# Patient Record
Sex: MALE | Race: WHITE | Employment: FULL TIME | URBAN - METROPOLITAN AREA
[De-identification: names, ages, dates, MRNs, and addresses within clinical notes are randomized per-mention and may not be internally consistent; named-entity substitution may affect disease eponyms.]

---

## 2017-01-09 ENCOUNTER — OFFICE VISIT (OUTPATIENT)
Dept: FAMILY MEDICINE CLINIC | Facility: CLINIC | Age: 52
End: 2017-01-09

## 2017-01-09 VITALS
DIASTOLIC BLOOD PRESSURE: 80 MMHG | SYSTOLIC BLOOD PRESSURE: 124 MMHG | TEMPERATURE: 98 F | HEART RATE: 70 BPM | RESPIRATION RATE: 20 BRPM | OXYGEN SATURATION: 98 % | WEIGHT: 231 LBS | BODY MASS INDEX: 33 KG/M2

## 2017-01-09 DIAGNOSIS — E78.00 HYPERCHOLESTEROLEMIA: ICD-10-CM

## 2017-01-09 DIAGNOSIS — E11.9 CONTROLLED TYPE 2 DIABETES MELLITUS WITHOUT COMPLICATION, WITHOUT LONG-TERM CURRENT USE OF INSULIN (HCC): Primary | ICD-10-CM

## 2017-01-09 DIAGNOSIS — G89.29 CHRONIC LEFT SHOULDER PAIN: ICD-10-CM

## 2017-01-09 DIAGNOSIS — M25.512 CHRONIC LEFT SHOULDER PAIN: ICD-10-CM

## 2017-01-09 PROCEDURE — 99214 OFFICE O/P EST MOD 30 MIN: CPT | Performed by: FAMILY MEDICINE

## 2017-01-09 RX ORDER — METFORMIN HYDROCHLORIDE 500 MG/1
500 TABLET, EXTENDED RELEASE ORAL 2 TIMES DAILY WITH MEALS
Qty: 180 TABLET | Refills: 1 | Status: SHIPPED | OUTPATIENT
Start: 2017-01-09 | End: 2017-07-05

## 2017-01-09 RX ORDER — ATORVASTATIN CALCIUM 20 MG/1
20 TABLET, FILM COATED ORAL NIGHTLY
Qty: 90 TABLET | Refills: 1 | Status: SHIPPED | OUTPATIENT
Start: 2017-01-09 | End: 2017-07-05

## 2017-01-09 NOTE — PATIENT INSTRUCTIONS
Continue metformin as you've been taking, once daily minimum, up to twice daily. Goal fasting is , goal after meals 140-180. Labs soon (this week).      Start atorvastatin (cholesterol lowering medicine), call if body aches or muscle aches in legs (th

## 2017-01-09 NOTE — PROGRESS NOTES
Tiffany Ashraf IS A 54year old male HERE FOR Patient presents with:  Diabetes       History of present illness:     Checking home sugars, over holidays higher. (average before holidays was fasting 140s to 150s, some 180s-190s holidays or late dinners). Take 1 tablet by mouth daily. Disp:  Rfl:    [DISCONTINUED] MetFORMIN HCl  MG Oral Tablet 24 Hr 1 to 2 pills daily as directed.  (Patient taking differently: Take 500 mg by mouth daily with breakfast.  ) Disp: 60 tablet Rfl: 3       Allergy:      No K rhythm  Abdomen soft nontender  Extremities Bilateral barefoot skin diabetic exam is normal, visualized feet and the appearance is normal.  Bilateral monofilament/sensation of both feet is normal.  Pulsation pedal pulse exam of both lower legs/feet is norm

## 2017-01-10 PROBLEM — E11.9 CONTROLLED TYPE 2 DIABETES MELLITUS WITHOUT COMPLICATION, WITHOUT LONG-TERM CURRENT USE OF INSULIN (HCC): Status: ACTIVE | Noted: 2017-01-10

## 2017-01-13 ENCOUNTER — LAB ENCOUNTER (OUTPATIENT)
Dept: LAB | Age: 52
End: 2017-01-13
Attending: FAMILY MEDICINE
Payer: COMMERCIAL

## 2017-01-13 DIAGNOSIS — E11.9 CONTROLLED TYPE 2 DIABETES MELLITUS WITHOUT COMPLICATION, WITHOUT LONG-TERM CURRENT USE OF INSULIN (HCC): ICD-10-CM

## 2017-01-13 DIAGNOSIS — M19.012 ARTHRITIS OF LEFT SHOULDER REGION: ICD-10-CM

## 2017-01-13 DIAGNOSIS — E78.00 HYPERCHOLESTEROLEMIA: ICD-10-CM

## 2017-01-13 LAB
ALBUMIN SERPL-MCNC: 3.7 G/DL (ref 3.5–4.8)
ALP LIVER SERPL-CCNC: 85 U/L (ref 45–117)
ALT SERPL-CCNC: 26 U/L (ref 17–63)
AST SERPL-CCNC: 19 U/L (ref 15–41)
BASOPHILS # BLD AUTO: 0.02 X10(3) UL (ref 0–0.1)
BASOPHILS NFR BLD AUTO: 0.3 %
BILIRUB SERPL-MCNC: 0.6 MG/DL (ref 0.1–2)
BUN BLD-MCNC: 13 MG/DL (ref 8–20)
CALCIUM BLD-MCNC: 8.5 MG/DL (ref 8.3–10.3)
CHLORIDE: 104 MMOL/L (ref 101–111)
CHOLEST SMN-MCNC: 158 MG/DL (ref ?–200)
CO2: 28 MMOL/L (ref 22–32)
CREAT BLD-MCNC: 1.1 MG/DL (ref 0.7–1.3)
CREAT UR-SCNC: 189 MG/DL
EOSINOPHIL # BLD AUTO: 0.17 X10(3) UL (ref 0–0.3)
EOSINOPHIL NFR BLD AUTO: 2.4 %
ERYTHROCYTE [DISTWIDTH] IN BLOOD BY AUTOMATED COUNT: 12.2 % (ref 11.5–16)
EST. AVERAGE GLUCOSE BLD GHB EST-MCNC: 143 MG/DL (ref 68–126)
GLUCOSE BLD-MCNC: 141 MG/DL (ref 70–99)
HBA1C MFR BLD HPLC: 6.6 % (ref ?–5.7)
HCT VFR BLD AUTO: 47.5 % (ref 37–53)
HDLC SERPL-MCNC: 45 MG/DL (ref 45–?)
HDLC SERPL: 3.51 {RATIO} (ref ?–4.97)
HGB BLD-MCNC: 16 G/DL (ref 13–17)
IMMATURE GRANULOCYTE COUNT: 0.04 X10(3) UL (ref 0–1)
IMMATURE GRANULOCYTE RATIO %: 0.6 %
LDLC SERPL CALC-MCNC: 93 MG/DL (ref ?–130)
LYMPHOCYTES # BLD AUTO: 2.71 X10(3) UL (ref 0.9–4)
LYMPHOCYTES NFR BLD AUTO: 38.1 %
M PROTEIN MFR SERPL ELPH: 7.1 G/DL (ref 6.1–8.3)
MCH RBC QN AUTO: 30.9 PG (ref 27–33.2)
MCHC RBC AUTO-ENTMCNC: 33.7 G/DL (ref 31–37)
MCV RBC AUTO: 91.7 FL (ref 80–99)
MICROALBUMIN UR-MCNC: 0.54 MG/DL
MICROALBUMIN/CREAT 24H UR-RTO: 2.9 UG/MG (ref ?–30)
MONOCYTES # BLD AUTO: 0.68 X10(3) UL (ref 0.1–0.6)
MONOCYTES NFR BLD AUTO: 9.6 %
NEUTROPHIL ABS PRELIM: 3.5 X10 (3) UL (ref 1.3–6.7)
NEUTROPHILS # BLD AUTO: 3.5 X10(3) UL (ref 1.3–6.7)
NEUTROPHILS NFR BLD AUTO: 49 %
NONHDLC SERPL-MCNC: 113 MG/DL (ref ?–130)
PLATELET # BLD AUTO: 253 10(3)UL (ref 150–450)
POTASSIUM SERPL-SCNC: 4.2 MMOL/L (ref 3.6–5.1)
RBC # BLD AUTO: 5.18 X10(6)UL (ref 4.3–5.7)
RED CELL DISTRIBUTION WIDTH-SD: 40.4 FL (ref 35.1–46.3)
SODIUM SERPL-SCNC: 141 MMOL/L (ref 136–144)
TRIGLYCERIDES: 101 MG/DL (ref ?–150)
VLDL: 20 MG/DL (ref 5–40)
WBC # BLD AUTO: 7.1 X10(3) UL (ref 4–13)

## 2017-01-13 PROCEDURE — 82570 ASSAY OF URINE CREATININE: CPT | Performed by: FAMILY MEDICINE

## 2017-01-13 PROCEDURE — 83036 HEMOGLOBIN GLYCOSYLATED A1C: CPT | Performed by: FAMILY MEDICINE

## 2017-01-13 PROCEDURE — 80053 COMPREHEN METABOLIC PANEL: CPT | Performed by: FAMILY MEDICINE

## 2017-01-13 PROCEDURE — 82043 UR ALBUMIN QUANTITATIVE: CPT | Performed by: FAMILY MEDICINE

## 2017-01-13 PROCEDURE — 80061 LIPID PANEL: CPT | Performed by: FAMILY MEDICINE

## 2017-01-13 PROCEDURE — 85025 COMPLETE CBC W/AUTO DIFF WBC: CPT | Performed by: FAMILY MEDICINE

## 2017-01-13 PROCEDURE — 36415 COLL VENOUS BLD VENIPUNCTURE: CPT | Performed by: FAMILY MEDICINE

## 2017-01-24 NOTE — TELEPHONE ENCOUNTER
Future Appointments    LOV 1/17    LAST LAB 1/17     LAST RX  ONETOUCH ULTRA BLUE In Vitro Strip 100 strip 0 11/1/2016         PROTOCOL  Diabetic Supplies Protocol Passed1    Refilled x 6 months.

## 2017-01-25 ENCOUNTER — TELEPHONE (OUTPATIENT)
Dept: FAMILY MEDICINE CLINIC | Facility: CLINIC | Age: 52
End: 2017-01-25

## 2017-01-25 NOTE — TELEPHONE ENCOUNTER
Future Appointments  Date Time Provider Florinda Jones   2/6/2017 9:00 AM Rashad Langford MD [facility]   2/17/2017 8:00 AM Rashad Langford MD [facility]     Probably should see us before shoulder surgery for medical clearance.  Can get pre-op l

## 2017-01-25 NOTE — TELEPHONE ENCOUNTER
Called and scheduled pre-surgical clearance appt. Date and time approved by [facility]     Future Appointments  Date Time Provider Florinda Jones   2/6/2017 7:00 AM [facility]   2/6/2017 7:30 AM [facility]   2/6/201

## 2017-02-06 ENCOUNTER — APPOINTMENT (OUTPATIENT)
Dept: LAB | Age: 52
End: 2017-02-06
Payer: COMMERCIAL

## 2017-02-06 ENCOUNTER — LAB ENCOUNTER (OUTPATIENT)
Dept: LAB | Age: 52
End: 2017-02-06
Payer: COMMERCIAL

## 2017-02-06 DIAGNOSIS — M19.012 ARTHRITIS OF LEFT SHOULDER REGION: ICD-10-CM

## 2017-02-06 LAB
ANTIBODY SCREEN: NEGATIVE
RH BLOOD TYPE: NEGATIVE

## 2017-02-06 PROCEDURE — 93005 ELECTROCARDIOGRAM TRACING: CPT | Performed by: INTERNAL MEDICINE

## 2017-02-06 PROCEDURE — 87147 CULTURE TYPE IMMUNOLOGIC: CPT

## 2017-02-06 PROCEDURE — 86850 RBC ANTIBODY SCREEN: CPT

## 2017-02-06 PROCEDURE — 36415 COLL VENOUS BLD VENIPUNCTURE: CPT

## 2017-02-06 PROCEDURE — 93010 ELECTROCARDIOGRAM REPORT: CPT | Performed by: INTERNAL MEDICINE

## 2017-02-06 PROCEDURE — 87081 CULTURE SCREEN ONLY: CPT

## 2017-02-06 PROCEDURE — 86900 BLOOD TYPING SEROLOGIC ABO: CPT

## 2017-02-06 PROCEDURE — 86901 BLOOD TYPING SEROLOGIC RH(D): CPT

## 2017-02-06 NOTE — H&P
[address]    PATIENT'S NAME: Tye Carmen   ATTENDING PHYSICIAN: Marcus Harrison M.D.    PATIENT ACCOUNT#:   [de-identified]    LOCATION:  [facility]  MEDICAL RECORD #:   [id number]       YOB: 1965  ADMISSION DATE:       02/17/2017    HIST Lipitor, metformin, glyburide, multivitamin. ALLERGIES:  None. SOCIAL HISTORY:  The patient lives in [address] [place] [address],[address]. He works for [employer]. He does not smoke. He does drink alcohol, 0 to 1 standard drink per week. He denies illicit drug use.     FAMILY HI

## 2017-02-09 LAB
P AXIS: 48 DEGREES
P-R INTERVAL: 150 MS
Q-T INTERVAL: 390 MS
QRS DURATION: 70 MS
QTC CALCULATION (BEZET): 430 MS
R AXIS: -4 DEGREES
T AXIS: 1 DEGREES
VENTRICULAR RATE: 73 BPM

## 2017-02-10 ENCOUNTER — ANESTHESIA EVENT (OUTPATIENT)
Dept: SURGERY | Facility: HOSPITAL | Age: 52
DRG: 483 | End: 2017-02-10
Payer: COMMERCIAL

## 2017-02-10 ENCOUNTER — OFFICE VISIT (OUTPATIENT)
Dept: FAMILY MEDICINE CLINIC | Facility: CLINIC | Age: 52
End: 2017-02-10

## 2017-02-10 VITALS
DIASTOLIC BLOOD PRESSURE: 80 MMHG | SYSTOLIC BLOOD PRESSURE: 118 MMHG | OXYGEN SATURATION: 98 % | BODY MASS INDEX: 33 KG/M2 | WEIGHT: 233 LBS | HEART RATE: 77 BPM

## 2017-02-10 DIAGNOSIS — Z01.818 PRE-OPERATIVE EXAM: Primary | ICD-10-CM

## 2017-02-10 DIAGNOSIS — E11.9 CONTROLLED TYPE 2 DIABETES MELLITUS WITHOUT COMPLICATION, WITHOUT LONG-TERM CURRENT USE OF INSULIN (HCC): ICD-10-CM

## 2017-02-10 DIAGNOSIS — M19.012 OSTEOARTHRITIS OF LEFT SHOULDER, UNSPECIFIED OSTEOARTHRITIS TYPE: ICD-10-CM

## 2017-02-10 DIAGNOSIS — E78.00 HYPERCHOLESTEROLEMIA: ICD-10-CM

## 2017-02-10 PROCEDURE — 99243 OFF/OP CNSLTJ NEW/EST LOW 30: CPT | Performed by: FAMILY MEDICINE

## 2017-02-10 NOTE — PATIENT INSTRUCTIONS
Don't take anti-inflammatories or herbal supplements in the next week. Take your metformin the night before surgery, atorvastatin also the day before surgery, no medications on day of surgery before operation.

## 2017-02-10 NOTE — H&P
Emily Sun is as 54year old male.  Patient presents with:  Schedule Surgery: on 2/17 for left shoulder replacement       Surgeon: Amaris Kruse  Procedure: Left shoulder replacement  Location: [facility]  Date: 2/17/17    We were requested by the surgeon a Grandmother    • Diabetes Maternal Grandmother    • Cancer Maternal Grandfather      Lung   • Cancer Paternal Grandmother      Brain       Social history:         Social History   Marital Status:   Spouse Name: N/A    Years of Education: N/A  Number change in appetite, heartburn, diarrhea, constipation. : No pain, frequency, urgency or incontinence with urination. Male: No testicular or scrotal swelling or tenderness.  No nocturia or difficulty with urine stream.  Rheumatologic: limited abduction l 02/06/2017     [facility] Lab Encounter on 02/06/2017  MSSA/MRSA Culture Date: 02/06/2017    Value: Light Staphylococcus aureus*   ABO BLOOD TYPE Value: O  Date: 02/06/2017   [facility] BLOOD TYPE Value: Negative  Date: 02/06/2017   Antibody Screen Value: Negative  Date: 33.7(g/dL)  Date: 01/13/2017   RDW Value: 12.2(%)  Date: 01/13/2017   RDW-SD Value: 40.4(fL)  Date: 01/13/2017   Neutrophil Absolute Prel* Value: 3.50(x10 (3) uL)  Date: 01/13/2017   Neutrophil Absolute Value: 3.50(x10(3) uL)  Date: 01/13/2017   Lymphocyte

## 2017-02-11 PROBLEM — M19.012 OSTEOARTHRITIS OF LEFT SHOULDER: Status: ACTIVE | Noted: 2017-02-11

## 2017-02-17 ENCOUNTER — ANESTHESIA (OUTPATIENT)
Dept: SURGERY | Facility: HOSPITAL | Age: 52
DRG: 483 | End: 2017-02-17
Payer: COMMERCIAL

## 2017-02-17 ENCOUNTER — HOSPITAL ENCOUNTER (INPATIENT)
Facility: HOSPITAL | Age: 52
LOS: 1 days | Discharge: HOME HEALTH | DRG: 483 | End: 2017-02-18
Attending: ORTHOPAEDIC SURGERY | Admitting: ORTHOPAEDIC SURGERY
Payer: COMMERCIAL

## 2017-02-17 ENCOUNTER — APPOINTMENT (OUTPATIENT)
Dept: GENERAL RADIOLOGY | Facility: HOSPITAL | Age: 52
DRG: 483 | End: 2017-02-17
Attending: ORTHOPAEDIC SURGERY
Payer: COMMERCIAL

## 2017-02-17 ENCOUNTER — SURGERY (OUTPATIENT)
Age: 52
End: 2017-02-17

## 2017-02-17 DIAGNOSIS — M19.012 ARTHRITIS OF LEFT SHOULDER REGION: Primary | ICD-10-CM

## 2017-02-17 LAB
CREAT BLD-MCNC: 1.04 MG/DL (ref 0.7–1.3)
GLUCOSE BLD-MCNC: 112 MG/DL (ref 65–99)
GLUCOSE BLD-MCNC: 133 MG/DL (ref 65–99)
GLUCOSE BLD-MCNC: 150 MG/DL (ref 65–99)
GLUCOSE BLD-MCNC: 226 MG/DL (ref 65–99)

## 2017-02-17 PROCEDURE — 73020 X-RAY EXAM OF SHOULDER: CPT

## 2017-02-17 PROCEDURE — 99252 IP/OBS CONSLTJ NEW/EST SF 35: CPT | Performed by: HOSPITALIST

## 2017-02-17 PROCEDURE — 0RRK0JZ REPLACEMENT OF LEFT SHOULDER JOINT WITH SYNTHETIC SUBSTITUTE, OPEN APPROACH: ICD-10-PCS | Performed by: ORTHOPAEDIC SURGERY

## 2017-02-17 DEVICE — IMPLANTABLE DEVICE: Type: IMPLANTABLE DEVICE | Site: SHOULDER | Status: FUNCTIONAL

## 2017-02-17 DEVICE — IMPLANTABLE DEVICE
Type: IMPLANTABLE DEVICE | Site: SHOULDER | Status: FUNCTIONAL
Brand: BIGLIANI/FLATOW® TRABECULAR METAL™

## 2017-02-17 RX ORDER — DEXTROSE MONOHYDRATE 25 G/50ML
50 INJECTION, SOLUTION INTRAVENOUS
Status: DISCONTINUED | OUTPATIENT
Start: 2017-02-17 | End: 2017-02-17 | Stop reason: HOSPADM

## 2017-02-17 RX ORDER — METFORMIN HYDROCHLORIDE 500 MG/1
500 TABLET, EXTENDED RELEASE ORAL 2 TIMES DAILY WITH MEALS
Status: DISCONTINUED | OUTPATIENT
Start: 2017-02-17 | End: 2017-02-17

## 2017-02-17 RX ORDER — DEXTROSE MONOHYDRATE 25 G/50ML
50 INJECTION, SOLUTION INTRAVENOUS
Status: DISCONTINUED | OUTPATIENT
Start: 2017-02-17 | End: 2017-02-18

## 2017-02-17 RX ORDER — HYDROMORPHONE HYDROCHLORIDE 1 MG/ML
0.2 INJECTION, SOLUTION INTRAMUSCULAR; INTRAVENOUS; SUBCUTANEOUS EVERY 2 HOUR PRN
Status: DISCONTINUED | OUTPATIENT
Start: 2017-02-17 | End: 2017-02-18

## 2017-02-17 RX ORDER — SODIUM CHLORIDE 9 MG/ML
INJECTION, SOLUTION INTRAVENOUS CONTINUOUS
Status: DISCONTINUED | OUTPATIENT
Start: 2017-02-17 | End: 2017-02-18

## 2017-02-17 RX ORDER — ATORVASTATIN CALCIUM 20 MG/1
20 TABLET, FILM COATED ORAL NIGHTLY
Status: DISCONTINUED | OUTPATIENT
Start: 2017-02-17 | End: 2017-02-18

## 2017-02-17 RX ORDER — NALOXONE HYDROCHLORIDE 0.4 MG/ML
80 INJECTION, SOLUTION INTRAMUSCULAR; INTRAVENOUS; SUBCUTANEOUS AS NEEDED
Status: DISCONTINUED | OUTPATIENT
Start: 2017-02-17 | End: 2017-02-17 | Stop reason: HOSPADM

## 2017-02-17 RX ORDER — ONDANSETRON 2 MG/ML
4 INJECTION INTRAMUSCULAR; INTRAVENOUS EVERY 4 HOURS PRN
Status: DISCONTINUED | OUTPATIENT
Start: 2017-02-17 | End: 2017-02-18

## 2017-02-17 RX ORDER — OXYCODONE HYDROCHLORIDE 10 MG/1
10 TABLET ORAL EVERY 4 HOURS PRN
Status: DISCONTINUED | OUTPATIENT
Start: 2017-02-17 | End: 2017-02-18

## 2017-02-17 RX ORDER — KETOROLAC TROMETHAMINE 30 MG/ML
30 INJECTION, SOLUTION INTRAMUSCULAR; INTRAVENOUS EVERY 6 HOURS
Status: DISCONTINUED | OUTPATIENT
Start: 2017-02-17 | End: 2017-02-18

## 2017-02-17 RX ORDER — HYDROMORPHONE HYDROCHLORIDE 1 MG/ML
0.4 INJECTION, SOLUTION INTRAMUSCULAR; INTRAVENOUS; SUBCUTANEOUS EVERY 5 MIN PRN
Status: DISCONTINUED | OUTPATIENT
Start: 2017-02-17 | End: 2017-02-17 | Stop reason: HOSPADM

## 2017-02-17 RX ORDER — DEXTROSE MONOHYDRATE 50 MG/ML
INJECTION, SOLUTION INTRAVENOUS CONTINUOUS
Status: DISCONTINUED | OUTPATIENT
Start: 2017-02-17 | End: 2017-02-17

## 2017-02-17 RX ORDER — OXYCODONE HCL 10 MG/1
10 TABLET, FILM COATED, EXTENDED RELEASE ORAL
Status: DISCONTINUED | OUTPATIENT
Start: 2017-02-17 | End: 2017-02-18

## 2017-02-17 RX ORDER — DOCUSATE SODIUM 100 MG/1
100 CAPSULE, LIQUID FILLED ORAL 2 TIMES DAILY
Status: DISCONTINUED | OUTPATIENT
Start: 2017-02-17 | End: 2017-02-18

## 2017-02-17 RX ORDER — OXYCODONE HYDROCHLORIDE 10 MG/1
20 TABLET ORAL EVERY 4 HOURS PRN
Status: DISCONTINUED | OUTPATIENT
Start: 2017-02-17 | End: 2017-02-18

## 2017-02-17 RX ORDER — BISACODYL 10 MG
10 SUPPOSITORY, RECTAL RECTAL
Status: DISCONTINUED | OUTPATIENT
Start: 2017-02-17 | End: 2017-02-18

## 2017-02-17 RX ORDER — FERROUS SULFATE 325(65) MG
325 TABLET ORAL
Qty: 30 TABLET | Refills: 1 | Status: SHIPPED | OUTPATIENT
Start: 2017-02-17 | End: 2018-03-05

## 2017-02-17 RX ORDER — OXYCODONE HYDROCHLORIDE 15 MG/1
15 TABLET ORAL EVERY 4 HOURS PRN
Status: DISCONTINUED | OUTPATIENT
Start: 2017-02-17 | End: 2017-02-18

## 2017-02-17 RX ORDER — OXYCODONE HYDROCHLORIDE 5 MG/1
5 TABLET ORAL EVERY 4 HOURS PRN
Status: DISCONTINUED | OUTPATIENT
Start: 2017-02-17 | End: 2017-02-18

## 2017-02-17 RX ORDER — ACETAMINOPHEN 325 MG/1
650 TABLET ORAL 4 TIMES DAILY
Status: DISCONTINUED | OUTPATIENT
Start: 2017-02-17 | End: 2017-02-18

## 2017-02-17 RX ORDER — POLYETHYLENE GLYCOL 3350 17 G/17G
17 POWDER, FOR SOLUTION ORAL DAILY PRN
Status: DISCONTINUED | OUTPATIENT
Start: 2017-02-17 | End: 2017-02-18

## 2017-02-17 RX ORDER — SCOLOPAMINE TRANSDERMAL SYSTEM 1 MG/1
1 PATCH, EXTENDED RELEASE TRANSDERMAL ONCE
Status: DISCONTINUED | OUTPATIENT
Start: 2017-02-17 | End: 2017-02-18

## 2017-02-17 RX ORDER — METOCLOPRAMIDE HYDROCHLORIDE 5 MG/ML
10 INJECTION INTRAMUSCULAR; INTRAVENOUS EVERY 6 HOURS PRN
Status: DISCONTINUED | OUTPATIENT
Start: 2017-02-17 | End: 2017-02-18

## 2017-02-17 RX ORDER — DIPHENHYDRAMINE HYDROCHLORIDE 50 MG/ML
25 INJECTION INTRAMUSCULAR; INTRAVENOUS ONCE AS NEEDED
Status: ACTIVE | OUTPATIENT
Start: 2017-02-17 | End: 2017-02-17

## 2017-02-17 RX ORDER — HYDROMORPHONE HYDROCHLORIDE 1 MG/ML
0.4 INJECTION, SOLUTION INTRAMUSCULAR; INTRAVENOUS; SUBCUTANEOUS EVERY 2 HOUR PRN
Status: DISCONTINUED | OUTPATIENT
Start: 2017-02-17 | End: 2017-02-18

## 2017-02-17 RX ORDER — ACETAMINOPHEN 325 MG/1
650 TABLET ORAL ONCE
Status: COMPLETED | OUTPATIENT
Start: 2017-02-17 | End: 2017-02-17

## 2017-02-17 RX ORDER — ONDANSETRON 2 MG/ML
4 INJECTION INTRAMUSCULAR; INTRAVENOUS AS NEEDED
Status: DISCONTINUED | OUTPATIENT
Start: 2017-02-17 | End: 2017-02-17 | Stop reason: HOSPADM

## 2017-02-17 RX ORDER — SODIUM CHLORIDE, SODIUM LACTATE, POTASSIUM CHLORIDE, CALCIUM CHLORIDE 600; 310; 30; 20 MG/100ML; MG/100ML; MG/100ML; MG/100ML
INJECTION, SOLUTION INTRAVENOUS CONTINUOUS
Status: DISCONTINUED | OUTPATIENT
Start: 2017-02-17 | End: 2017-02-17

## 2017-02-17 RX ORDER — HYDROMORPHONE HYDROCHLORIDE 1 MG/ML
0.5 INJECTION, SOLUTION INTRAMUSCULAR; INTRAVENOUS; SUBCUTANEOUS EVERY 2 HOUR PRN
Status: DISCONTINUED | OUTPATIENT
Start: 2017-02-17 | End: 2017-02-18

## 2017-02-17 RX ORDER — ACETAMINOPHEN 325 MG/1
TABLET ORAL
Status: COMPLETED
Start: 2017-02-17 | End: 2017-02-17

## 2017-02-17 RX ORDER — INSULIN ASPART 100 [IU]/ML
INJECTION, SOLUTION INTRAVENOUS; SUBCUTANEOUS ONCE
Status: DISCONTINUED | OUTPATIENT
Start: 2017-02-17 | End: 2017-02-18

## 2017-02-17 RX ORDER — DOCUSATE SODIUM 100 MG/1
100 CAPSULE, LIQUID FILLED ORAL 2 TIMES DAILY
Qty: 60 CAPSULE | Refills: 1 | Status: SHIPPED | OUTPATIENT
Start: 2017-02-17 | End: 2018-03-05

## 2017-02-17 RX ORDER — HYDROMORPHONE HYDROCHLORIDE 1 MG/ML
0.3 INJECTION, SOLUTION INTRAMUSCULAR; INTRAVENOUS; SUBCUTANEOUS EVERY 2 HOUR PRN
Status: DISCONTINUED | OUTPATIENT
Start: 2017-02-17 | End: 2017-02-18

## 2017-02-17 RX ORDER — HYDROCODONE BITARTRATE AND ACETAMINOPHEN 10; 325 MG/1; MG/1
1-2 TABLET ORAL EVERY 4 HOURS PRN
Qty: 80 TABLET | Refills: 0 | Status: SHIPPED | OUTPATIENT
Start: 2017-02-17 | End: 2017-07-05

## 2017-02-17 RX ORDER — OXYCODONE HCL 10 MG/1
10 TABLET, FILM COATED, EXTENDED RELEASE ORAL
Status: COMPLETED | OUTPATIENT
Start: 2017-02-17 | End: 2017-02-17

## 2017-02-17 RX ORDER — CYCLOBENZAPRINE HCL 5 MG
5 TABLET ORAL 3 TIMES DAILY PRN
Status: DISCONTINUED | OUTPATIENT
Start: 2017-02-17 | End: 2017-02-18

## 2017-02-17 RX ORDER — SODIUM PHOSPHATE, DIBASIC AND SODIUM PHOSPHATE, MONOBASIC 7; 19 G/133ML; G/133ML
1 ENEMA RECTAL ONCE AS NEEDED
Status: ACTIVE | OUTPATIENT
Start: 2017-02-17 | End: 2017-02-17

## 2017-02-17 NOTE — INTERVAL H&P NOTE
Pre-op Diagnosis: OSTEOARTHRITIS LEFT SHOULDER    The above referenced H&P was reviewed by BIJAN Garcia on 2/17/2017, the patient was examined and no significant changes have occurred in the patient's condition since the H&P was performed.   I discusse

## 2017-02-17 NOTE — ANESTHESIA POSTPROCEDURE EVALUATION
[facility] Patient Status:  Surgery Admit   Age/Gender 54year old male MRN [id number]   Location [facility] Attending Jenna Jung MD   Hosp Day # 0 PCP Ciaran Mccord MD       Anesthesia Post-o

## 2017-02-17 NOTE — PLAN OF CARE
Diabetes/Glucose Control    • Glucose maintained within prescribed range Progressing        PAIN - ADULT    • Verbalizes/displays adequate comfort level or patient's stated pain goal Progressing        Patient/Family Goals    • Patient/Family [facility]

## 2017-02-17 NOTE — CONSULTS
[address] Patient Status:  Inpatient    1965 MRN [id number]   [facility] 3SW-A Attending Alyson Akins MD   Hosp Day # 0 PCP Dottie Duque MD     Reason for consult: Diabetes mellitus    R Take 1 tablet by mouth daily. Disp:  Rfl:        Review of Systems:   A comprehensive 14 point review of systems was completed. Pertinent positives and negatives noted in the HPI.     Physical Exam:    /86 mmHg  Pulse 69  Temp(Src) 97.6 °F (36.4 °C

## 2017-02-17 NOTE — BRIEF OP NOTE
[facility] SURGERY  Brief Op Note     Miroslava Miner Location: OR   CSN [id number] MRN [id number]   Admission Date 2/17/2017 Operation Date 2/17/2017   Attending Physician Ruth Rdz MD Operating Physician BIJAN Loja       Pre-Operative Luz Marina

## 2017-02-18 VITALS
DIASTOLIC BLOOD PRESSURE: 93 MMHG | SYSTOLIC BLOOD PRESSURE: 131 MMHG | RESPIRATION RATE: 18 BRPM | OXYGEN SATURATION: 98 % | HEIGHT: 70 IN | TEMPERATURE: 98 F | BODY MASS INDEX: 33.64 KG/M2 | HEART RATE: 88 BPM | WEIGHT: 235 LBS

## 2017-02-18 LAB
BUN BLD-MCNC: 12 MG/DL (ref 8–20)
CALCIUM BLD-MCNC: 7.9 MG/DL (ref 8.3–10.3)
CHLORIDE: 104 MMOL/L (ref 101–111)
CO2: 27 MMOL/L (ref 22–32)
CREAT BLD-MCNC: 0.96 MG/DL (ref 0.7–1.3)
ERYTHROCYTE [DISTWIDTH] IN BLOOD BY AUTOMATED COUNT: 12.8 % (ref 11.5–16)
EST. AVERAGE GLUCOSE BLD GHB EST-MCNC: 143 MG/DL (ref 68–126)
GLUCOSE BLD-MCNC: 119 MG/DL (ref 65–99)
GLUCOSE BLD-MCNC: 179 MG/DL (ref 65–99)
GLUCOSE BLD-MCNC: 180 MG/DL (ref 70–99)
HBA1C MFR BLD HPLC: 6.6 % (ref ?–5.7)
HCT VFR BLD AUTO: 41.9 % (ref 37–53)
HGB BLD-MCNC: 14.1 G/DL (ref 13–17)
MCH RBC QN AUTO: 30.5 PG (ref 27–33.2)
MCHC RBC AUTO-ENTMCNC: 33.7 G/DL (ref 31–37)
MCV RBC AUTO: 90.7 FL (ref 80–99)
PLATELET # BLD AUTO: 216 10(3)UL (ref 150–450)
POTASSIUM SERPL-SCNC: 3.7 MMOL/L (ref 3.6–5.1)
RBC # BLD AUTO: 4.62 X10(6)UL (ref 4.3–5.7)
RED CELL DISTRIBUTION WIDTH-SD: 41.5 FL (ref 35.1–46.3)
SODIUM SERPL-SCNC: 140 MMOL/L (ref 136–144)
WBC # BLD AUTO: 11.8 X10(3) UL (ref 4–13)

## 2017-02-18 PROCEDURE — 99231 SBSQ HOSP IP/OBS SF/LOW 25: CPT | Performed by: HOSPITALIST

## 2017-02-18 RX ORDER — HYDROCODONE BITARTRATE AND ACETAMINOPHEN 10; 325 MG/1; MG/1
2 TABLET ORAL EVERY 4 HOURS PRN
Status: DISCONTINUED | OUTPATIENT
Start: 2017-02-18 | End: 2017-02-18

## 2017-02-18 RX ORDER — HYDROCODONE BITARTRATE AND ACETAMINOPHEN 10; 325 MG/1; MG/1
1 TABLET ORAL EVERY 4 HOURS PRN
Status: DISCONTINUED | OUTPATIENT
Start: 2017-02-18 | End: 2017-02-18

## 2017-02-18 RX ORDER — POLYETHYLENE GLYCOL 3350 17 G/17G
17 POWDER, FOR SOLUTION ORAL DAILY PRN
Qty: 7 EACH | Refills: 0 | Status: SHIPPED | COMMUNITY
Start: 2017-02-18 | End: 2017-02-25

## 2017-02-18 NOTE — OCCUPATIONAL THERAPY NOTE
OCCUPATIONAL THERAPY QUICK EVALUATION - INPATIENT    Room Number: 383/383-A  Evaluation Date: 2/18/2017     Type of Evaluation: Initial  Presenting Problem: L TSA    Physician Order: IP Consult to Occupational Therapy  Reason for Therapy:  ADL/IADL Dysfunc tested    NEUROLOGICAL FINDINGS  Neurological Findings: Coordination - Finger Opposition        Coordination - Finger Opposition: Symmetrical       ACTIVITIES OF DAILY LIVING ASSESSMENT  AM-PAC ‘6-Clicks’ Inpatient Daily Activity Short Form  How much help with no skilled Occupational Therapy needs at this time. Patient discharged from Occupational Therapy services. Please re-order if a new functional limitation presents during this admission.     Patient was able to achieve the following goals:  Patient ab

## 2017-02-18 NOTE — OPERATIVE REPORT
[address]    PATIENT'S NAME: Bishop Villaseñor   ATTENDING PHYSICIAN: Patricia Purdy M.D. OPERATING PHYSICIAN: Patricia Purdy M.D.    PATIENT ACCOUNT#:   [de-identified]    LOCATION:  [address] [place] [address]  MEDICAL RECORD #:   [id number]       DATE OF BIRTH:  [birth year] the subscapularis near its insertion on the lesser tuberosity. The subscapularis was then released from the lesser tuberosity and allowed to retract medially.   The biceps tendon sheath was opened and numerous osteocartilaginous loose bodies were evacuated inferior drill holes. I copiously irrigated the glenoid I then chose my first component, which was a 52 mm trabecular metal glenoid component. I impacted the glenoid component in position after copiously irrigating and locked it into position.   I used a discussed with the patient's family, and postoperative instructions were written. The assistant surgeon was mandatory to assist with positioning the patient, retraction, closure of the incision, and application of dressings.     Dictated By Alexsandra Bang,

## 2017-02-18 NOTE — CM/SW NOTE
02/18/17 1500   Discharge disposition   Discharged to: Home-Health   Name of Facillity/Home Care/Hospice Residential

## 2017-02-18 NOTE — CM/SW NOTE
02/18/17 1500   CM/SW Referral Data   Referral Source Physician   Reason for Referral Discharge planning   Pertinent Medical Hx   Primary Care Physician Name dr Herbert Kaba   Social History   Recreational Drug/Alcohol Use n   Major Changes Last 6 Months n

## 2017-02-18 NOTE — PROGRESS NOTES
[address] [facility] Patient Status:  Inpatient    1965 MRN [id number]   [facility] 3SW-A Attending Binta Mares MD   [facility] Day # 1 PCP MD Oly Kurtz is a 54year old male patient.     Jenelle Status post total shoulder arthroplasty      Blood pressure 125/89, pulse 100, temperature 98.4 °F (36.9 °C), temperature source Oral, resp. rate 18, height 5' 10\" (1.778 m), weight 235 lb (106.595 kg), SpO2 98 %. Subjective:  Symptoms:  Stable.     Die

## 2017-02-18 NOTE — PHYSICAL THERAPY NOTE
PHYSICAL THERAPY QUICK EVALUATION - INPATIENT    Room Number: 383/383-A  Evaluation Date: 2/18/2017  Presenting Problem: s/p L TSA 2/17/17  Physician Order: PT Eval and Treat    Problem List  Active Problems:    Arthritis of left shoulder region    Type 2 Moving from lying on back to sitting on the side of the bed?: None   How much help from another person does the patient currently need. ..   -   Moving to and from a bed to a chair (including a wheelchair)?: None   -   Need to walk in hospital room?: None function. PT Discharge Recommendations: Home;Outpatient PT    PLAN  Patient has been evaluated and presents with no skilled Physical Therapy needs at this time. Patient discharged from Physical Therapy services.   Please re-order if a new functional kim

## 2017-02-18 NOTE — PROGRESS NOTES
Post Op Day 1 Ortho Note    Status Post Nerve Block:  Type of Nerve Block: Left Interscalene total shoulder  Single Injection Nerve Block    Post op review: No evidence of immediate block related complications and No paresthesia noted     Assessed pt sitti

## 2017-02-18 NOTE — PLAN OF CARE
Diabetes/Glucose Control    • Glucose maintained within prescribed range Progressing        PAIN - ADULT    • Verbalizes/displays adequate comfort level or patient's stated pain goal Progressing        Patient/Family Goals    • Patient/Family Short Term Go

## 2017-02-18 NOTE — PLAN OF CARE
Diabetes/Glucose Control    • Glucose maintained within prescribed range Adequate for Discharge        Impaired Activities of Daily Living    • Achieve highest/safest level of independence in self care Adequate for Discharge        Impaired Functional Mobi

## 2017-02-18 NOTE — PROGRESS NOTES
[facility]  Progress Note     Daiva Skill Patient Status:  Inpatient    1965 MRN [id number]   [facility] 3SW-A Attending Zaki Tamayo MD   Hosp Day # 1 PCP Parth Pace MD     Chief Complaint: Left shoulder arthrop 2/17/2017  1. Pain control  2. PT/OT  3. Ortho following  2. Diabetes mellitus, type 2, not on insulin  1. Metformin  2. Amaryl  3. Dyslipidemia  1. Statin    Home today. Plan of care discussed with patient, family and RN.   Harshal Meyer MD

## 2017-02-20 PROBLEM — Z47.89 ORTHOPEDIC AFTERCARE: Status: ACTIVE | Noted: 2017-02-20

## 2017-02-28 ENCOUNTER — MED REC SCAN ONLY (OUTPATIENT)
Dept: FAMILY MEDICINE CLINIC | Facility: CLINIC | Age: 52
End: 2017-02-28

## 2017-03-09 ENCOUNTER — OFFICE VISIT (OUTPATIENT)
Dept: FAMILY MEDICINE CLINIC | Facility: CLINIC | Age: 52
End: 2017-03-09

## 2017-03-09 VITALS
DIASTOLIC BLOOD PRESSURE: 84 MMHG | HEIGHT: 67.5 IN | BODY MASS INDEX: 35.93 KG/M2 | RESPIRATION RATE: 16 BRPM | SYSTOLIC BLOOD PRESSURE: 124 MMHG | WEIGHT: 231.63 LBS | TEMPERATURE: 98 F | OXYGEN SATURATION: 97 % | HEART RATE: 76 BPM

## 2017-03-09 DIAGNOSIS — E78.00 HYPERCHOLESTEROLEMIA: ICD-10-CM

## 2017-03-09 DIAGNOSIS — Z96.612 STATUS POST TOTAL SHOULDER ARTHROPLASTY, LEFT: ICD-10-CM

## 2017-03-09 DIAGNOSIS — E11.9 CONTROLLED TYPE 2 DIABETES MELLITUS WITHOUT COMPLICATION, WITHOUT LONG-TERM CURRENT USE OF INSULIN (HCC): Primary | ICD-10-CM

## 2017-03-09 PROCEDURE — 99213 OFFICE O/P EST LOW 20 MIN: CPT | Performed by: FAMILY MEDICINE

## 2017-03-09 NOTE — PROGRESS NOTES
Sangeeta Nickerson IS A 54year old male HERE FOR Patient presents with:  Surgical Followup: left total shoulder surgery 2/17/17       History of present illness:     Sees Dr. Livier Barron Monday, will start PT next week Wednesday if [id number] Anay Novak  Has little pain except for OneTouch Lancets Does not apply Misc Test once daily Disp: 100 each Rfl: 3   Multiple Vitamins-Minerals (MULTIVITAMIN OR) Take 1 tablet by mouth daily.  Disp:  Rfl:        Allergy:      No Known Allergies    Family history:       Family History   Problem S3 S4 murmur  Extremities left shoulder is in immobilizer.   Bilateral barefoot skin diabetic exam is normal, visualized feet and the appearance is normal. High arch, some hammertoe changes  Bilateral monofilament/sensation of both feet is normal.  Pulsatio

## 2017-03-09 NOTE — PATIENT INSTRUCTIONS
Blood pressure is improved from pre-op, no blood pressure meds needed currently. Continue diabetes meds & atorvastatin, call when refills needed. Labs in 3 months and then see me. If stable, revert to every 6 months followup.

## 2017-05-18 PROBLEM — Z96.612 STATUS POST TOTAL SHOULDER ARTHROPLASTY, LEFT: Status: ACTIVE | Noted: 2017-05-18

## 2017-06-30 ENCOUNTER — APPOINTMENT (OUTPATIENT)
Dept: LAB | Age: 52
End: 2017-06-30
Attending: FAMILY MEDICINE
Payer: COMMERCIAL

## 2017-06-30 DIAGNOSIS — E11.9 CONTROLLED TYPE 2 DIABETES MELLITUS WITHOUT COMPLICATION, WITHOUT LONG-TERM CURRENT USE OF INSULIN (HCC): ICD-10-CM

## 2017-06-30 DIAGNOSIS — E78.00 HYPERCHOLESTEROLEMIA: ICD-10-CM

## 2017-06-30 LAB
ALBUMIN SERPL-MCNC: 3.8 G/DL (ref 3.5–4.8)
ALP LIVER SERPL-CCNC: 84 U/L (ref 45–117)
ALT SERPL-CCNC: 26 U/L (ref 17–63)
AST SERPL-CCNC: 16 U/L (ref 15–41)
BILIRUB SERPL-MCNC: 0.7 MG/DL (ref 0.1–2)
BUN BLD-MCNC: 17 MG/DL (ref 8–20)
CALCIUM BLD-MCNC: 8.7 MG/DL (ref 8.3–10.3)
CHLORIDE: 105 MMOL/L (ref 101–111)
CHOLEST SMN-MCNC: 196 MG/DL (ref ?–200)
CO2: 27 MMOL/L (ref 22–32)
CREAT BLD-MCNC: 1.09 MG/DL (ref 0.7–1.3)
EST. AVERAGE GLUCOSE BLD GHB EST-MCNC: 163 MG/DL (ref 68–126)
GLUCOSE BLD-MCNC: 167 MG/DL (ref 70–99)
HBA1C MFR BLD HPLC: 7.3 % (ref ?–5.7)
HDLC SERPL-MCNC: 45 MG/DL (ref 45–?)
HDLC SERPL: 4.36 {RATIO} (ref ?–4.97)
LDLC SERPL CALC-MCNC: 127 MG/DL (ref ?–130)
M PROTEIN MFR SERPL ELPH: 7.1 G/DL (ref 6.1–8.3)
NONHDLC SERPL-MCNC: 151 MG/DL (ref ?–130)
POTASSIUM SERPL-SCNC: 3.8 MMOL/L (ref 3.6–5.1)
SODIUM SERPL-SCNC: 140 MMOL/L (ref 136–144)
TRIGLYCERIDES: 119 MG/DL (ref ?–150)
VLDL: 24 MG/DL (ref 5–40)

## 2017-06-30 PROCEDURE — 80053 COMPREHEN METABOLIC PANEL: CPT

## 2017-06-30 PROCEDURE — 83036 HEMOGLOBIN GLYCOSYLATED A1C: CPT

## 2017-06-30 PROCEDURE — 36415 COLL VENOUS BLD VENIPUNCTURE: CPT

## 2017-06-30 PROCEDURE — 80061 LIPID PANEL: CPT

## 2017-07-05 ENCOUNTER — OFFICE VISIT (OUTPATIENT)
Dept: FAMILY MEDICINE CLINIC | Facility: CLINIC | Age: 52
End: 2017-07-05

## 2017-07-05 VITALS
DIASTOLIC BLOOD PRESSURE: 88 MMHG | TEMPERATURE: 99 F | BODY MASS INDEX: 38 KG/M2 | OXYGEN SATURATION: 97 % | RESPIRATION RATE: 20 BRPM | SYSTOLIC BLOOD PRESSURE: 114 MMHG | HEART RATE: 68 BPM | WEIGHT: 244 LBS

## 2017-07-05 DIAGNOSIS — E78.00 HYPERCHOLESTEROLEMIA: ICD-10-CM

## 2017-07-05 DIAGNOSIS — E11.9 CONTROLLED TYPE 2 DIABETES MELLITUS WITHOUT COMPLICATION, WITHOUT LONG-TERM CURRENT USE OF INSULIN (HCC): ICD-10-CM

## 2017-07-05 PROCEDURE — 99213 OFFICE O/P EST LOW 20 MIN: CPT | Performed by: FAMILY MEDICINE

## 2017-07-05 RX ORDER — ATORVASTATIN CALCIUM 20 MG/1
20 TABLET, FILM COATED ORAL NIGHTLY
Qty: 90 TABLET | Refills: 1 | Status: SHIPPED | OUTPATIENT
Start: 2017-07-05 | End: 2017-10-19

## 2017-07-05 RX ORDER — GLIMEPIRIDE 2 MG/1
4 TABLET ORAL
Qty: 180 TABLET | Refills: 1 | Status: SHIPPED | OUTPATIENT
Start: 2017-07-05 | End: 2017-10-19

## 2017-07-05 RX ORDER — METFORMIN HYDROCHLORIDE 500 MG/1
500 TABLET, EXTENDED RELEASE ORAL 2 TIMES DAILY WITH MEALS
Qty: 180 TABLET | Refills: 1 | Status: SHIPPED | OUTPATIENT
Start: 2017-07-05 | End: 2017-10-19

## 2017-07-05 NOTE — PROGRESS NOTES
Lissa Staff IS A 50year old male HERE FOR Patient presents with:  Diabetes: f/u Diabetes check       History of present illness:     Has 2 more weeks softball/baseball games with his kids, he has been attending games and eating more concession stand Allergy:      No Known Allergies    Family history:       Family History   Problem Relation Age of Onset   • Diabetes Father    • Hypertension Father    • Cancer Father 79     stomach   • Hypertension Mother    • Hypertension Maternal Grandmother monofilament/sensation of both feet is normal.  Pulsation pedal pulse exam of both lower legs/feet is normal as well. High arches, hammertoe appearance, but no calluses.      Test results:   Appointment on 06/30/2017   Component Date Value   • Glucose 06/30 see me.

## 2017-07-05 NOTE — PATIENT INSTRUCTIONS
Try daily glimepiride 4 mg (two tablets with dinner). When eating fewer carbs you may be able to cut back to 2 mg daily. Goal for fasting is 110-130 and 2 hours after a meal 120-160. Recheck labs in 3 months, then see me.

## 2017-08-09 ENCOUNTER — OFFICE VISIT (OUTPATIENT)
Dept: FAMILY MEDICINE CLINIC | Facility: CLINIC | Age: 52
End: 2017-08-09

## 2017-08-09 VITALS
HEIGHT: 69 IN | RESPIRATION RATE: 16 BRPM | OXYGEN SATURATION: 99 % | WEIGHT: 227.13 LBS | TEMPERATURE: 98 F | HEART RATE: 78 BPM | BODY MASS INDEX: 33.64 KG/M2 | SYSTOLIC BLOOD PRESSURE: 130 MMHG | DIASTOLIC BLOOD PRESSURE: 70 MMHG

## 2017-08-09 DIAGNOSIS — L82.1 SEBORRHEIC KERATOSIS: Primary | ICD-10-CM

## 2017-08-09 PROCEDURE — 99212 OFFICE O/P EST SF 10 MIN: CPT | Performed by: FAMILY MEDICINE

## 2017-08-09 NOTE — PROGRESS NOTES
Rody White IS A 50year old male HERE FOR Patient presents with:  Moles       History of present illness:     Noted a lesion lateral left back, changing. No FHx skin cancer or melanoma.  People on one side of his family have had spots similar to hi Lung   • Cancer Paternal Grandmother      Brain       Social history:         Social History  Social History   Marital status:   Spouse name: N/A    Years of education: N/A  Number of children: 2     Occupational History  Service (customer service Your spot on the left side (and another closer to the middle of the back) is a seborrheic keratosis. These are very benign superficial raised waxy/scaly/greasy growths that are common with middle age and beyond, often inherited tendency to them.  If dark bl · Cutting them off with a scalpel  · Burning them off with electricity  What are the complications of seborrheic keratoses? Seborrheic keratoses are not cancer, but they can look like some types of skin cancer.  So it’s a good idea to ask your healthcare p

## 2017-08-09 NOTE — PATIENT INSTRUCTIONS
Your spot on the left side (and another closer to the middle of the back) is a seborrheic keratosis. These are very benign superficial raised waxy/scaly/greasy growths that are common with middle age and beyond, often inherited tendency to them.  If dark bl · Cutting them off with a scalpel  · Burning them off with electricity  What are the complications of seborrheic keratoses? Seborrheic keratoses are not cancer, but they can look like some types of skin cancer.  So it’s a good idea to ask your healthcare p

## 2017-10-17 ENCOUNTER — APPOINTMENT (OUTPATIENT)
Dept: LAB | Age: 52
End: 2017-10-17
Attending: FAMILY MEDICINE
Payer: COMMERCIAL

## 2017-10-17 ENCOUNTER — TELEPHONE (OUTPATIENT)
Dept: FAMILY MEDICINE CLINIC | Facility: CLINIC | Age: 52
End: 2017-10-17

## 2017-10-17 DIAGNOSIS — E11.9 DIABETES MELLITUS WITHOUT COMPLICATION (HCC): Primary | ICD-10-CM

## 2017-10-17 DIAGNOSIS — E11.9 DIABETES MELLITUS WITHOUT COMPLICATION (HCC): ICD-10-CM

## 2017-10-17 PROCEDURE — 36415 COLL VENOUS BLD VENIPUNCTURE: CPT

## 2017-10-17 PROCEDURE — 80048 BASIC METABOLIC PNL TOTAL CA: CPT

## 2017-10-17 PROCEDURE — 83036 HEMOGLOBIN GLYCOSYLATED A1C: CPT

## 2017-10-19 ENCOUNTER — OFFICE VISIT (OUTPATIENT)
Dept: FAMILY MEDICINE CLINIC | Facility: CLINIC | Age: 52
End: 2017-10-19

## 2017-10-19 VITALS
TEMPERATURE: 98 F | WEIGHT: 226 LBS | HEART RATE: 82 BPM | HEIGHT: 69.25 IN | SYSTOLIC BLOOD PRESSURE: 138 MMHG | BODY MASS INDEX: 33.09 KG/M2 | DIASTOLIC BLOOD PRESSURE: 82 MMHG

## 2017-10-19 DIAGNOSIS — E78.00 HYPERCHOLESTEROLEMIA: ICD-10-CM

## 2017-10-19 DIAGNOSIS — Z13.0 SCREENING FOR DEFICIENCY ANEMIA: ICD-10-CM

## 2017-10-19 DIAGNOSIS — Z12.11 SCREEN FOR COLON CANCER: ICD-10-CM

## 2017-10-19 DIAGNOSIS — E11.9 CONTROLLED TYPE 2 DIABETES MELLITUS WITHOUT COMPLICATION, WITHOUT LONG-TERM CURRENT USE OF INSULIN (HCC): Primary | ICD-10-CM

## 2017-10-19 DIAGNOSIS — Z13.29 SCREENING FOR THYROID DISORDER: ICD-10-CM

## 2017-10-19 DIAGNOSIS — Z12.5 SCREENING FOR PROSTATE CANCER: ICD-10-CM

## 2017-10-19 DIAGNOSIS — Z23 NEED FOR VACCINATION: ICD-10-CM

## 2017-10-19 PROCEDURE — 90471 IMMUNIZATION ADMIN: CPT | Performed by: FAMILY MEDICINE

## 2017-10-19 PROCEDURE — 99214 OFFICE O/P EST MOD 30 MIN: CPT | Performed by: FAMILY MEDICINE

## 2017-10-19 PROCEDURE — 90686 IIV4 VACC NO PRSV 0.5 ML IM: CPT | Performed by: FAMILY MEDICINE

## 2017-10-19 RX ORDER — ATORVASTATIN CALCIUM 20 MG/1
20 TABLET, FILM COATED ORAL NIGHTLY
Qty: 90 TABLET | Refills: 1 | Status: SHIPPED | OUTPATIENT
Start: 2017-10-19 | End: 2018-03-05 | Stop reason: DRUGHIGH

## 2017-10-19 RX ORDER — GLIMEPIRIDE 2 MG/1
4 TABLET ORAL
Qty: 180 TABLET | Refills: 1 | Status: SHIPPED | OUTPATIENT
Start: 2017-10-19 | End: 2018-03-05

## 2017-10-19 RX ORDER — METFORMIN HYDROCHLORIDE 500 MG/1
TABLET, EXTENDED RELEASE ORAL
Qty: 270 TABLET | Refills: 1 | Status: SHIPPED | OUTPATIENT
Start: 2017-10-19 | End: 2018-03-05

## 2017-10-19 NOTE — PATIENT INSTRUCTIONS
Health screening: colonoscopy referral now, & PSA I'll order with next labs, recommend tetanus (Tdap) booster at physical in January, rectal exam in January for prostate, & diabetic followup at same visit.

## 2017-10-19 NOTE — PROGRESS NOTES
Kay Nhi IS A 50year old male HERE FOR Patient presents with:  Diabetes  Hypercholesterolemia       History of present illness:     Since last time, walking 3 miles/day in AM with wife & neighbor.  Was doing well with diet then at change of season, Father    • Cancer Father 79     stomach   • Hypertension Mother    • Hypertension Maternal Grandmother    • Diabetes Maternal Grandmother    • Cancer Maternal Grandfather      Lung   • Cancer Paternal Grandmother      Brain       Social history:         S monofilament/sensation of both feet is normal.  Pulsation pedal pulse exam of both lower legs/feet is normal as well.       Test results:   Appointment on 10/17/2017   Component Date Value   • HgbA1C 10/17/2017 7.0*   • Estimated Average Glucose 10/17/2017 diabetic followup at same visit.

## 2018-03-03 ENCOUNTER — LABORATORY ENCOUNTER (OUTPATIENT)
Dept: LAB | Age: 53
End: 2018-03-03
Attending: FAMILY MEDICINE
Payer: COMMERCIAL

## 2018-03-03 DIAGNOSIS — E78.00 HYPERCHOLESTEROLEMIA: ICD-10-CM

## 2018-03-03 DIAGNOSIS — Z12.11 SCREEN FOR COLON CANCER: ICD-10-CM

## 2018-03-03 DIAGNOSIS — Z12.5 SCREENING FOR PROSTATE CANCER: ICD-10-CM

## 2018-03-03 DIAGNOSIS — Z13.0 SCREENING FOR DEFICIENCY ANEMIA: ICD-10-CM

## 2018-03-03 DIAGNOSIS — E11.9 CONTROLLED TYPE 2 DIABETES MELLITUS WITHOUT COMPLICATION, WITHOUT LONG-TERM CURRENT USE OF INSULIN (HCC): ICD-10-CM

## 2018-03-03 DIAGNOSIS — Z13.29 SCREENING FOR THYROID DISORDER: ICD-10-CM

## 2018-03-03 LAB
ALBUMIN SERPL-MCNC: 3.7 G/DL (ref 3.5–4.8)
ALP LIVER SERPL-CCNC: 82 U/L (ref 45–117)
ALT SERPL-CCNC: 21 U/L (ref 17–63)
AST SERPL-CCNC: 18 U/L (ref 15–41)
BASOPHILS # BLD AUTO: 0.03 X10(3) UL (ref 0–0.1)
BASOPHILS NFR BLD AUTO: 0.4 %
BILIRUB SERPL-MCNC: 0.6 MG/DL (ref 0.1–2)
BUN BLD-MCNC: 14 MG/DL (ref 8–20)
CALCIUM BLD-MCNC: 8.8 MG/DL (ref 8.3–10.3)
CHLORIDE: 103 MMOL/L (ref 101–111)
CHOLEST SMN-MCNC: 202 MG/DL (ref ?–200)
CO2: 27 MMOL/L (ref 22–32)
COMPLEXED PSA SERPL-MCNC: 0.51 NG/ML (ref 0.01–4)
CREAT BLD-MCNC: 1.04 MG/DL (ref 0.7–1.3)
CREAT UR-SCNC: 50.2 MG/DL
DEPRECATED HBV CORE AB SER IA-ACNC: 203.3 NG/ML (ref 22–322)
EOSINOPHIL # BLD AUTO: 0.14 X10(3) UL (ref 0–0.3)
EOSINOPHIL NFR BLD AUTO: 2.1 %
ERYTHROCYTE [DISTWIDTH] IN BLOOD BY AUTOMATED COUNT: 12.6 % (ref 11.5–16)
EST. AVERAGE GLUCOSE BLD GHB EST-MCNC: 143 MG/DL (ref 68–126)
GLUCOSE BLD-MCNC: 133 MG/DL (ref 70–99)
HBA1C MFR BLD HPLC: 6.6 % (ref ?–5.7)
HCT VFR BLD AUTO: 49.1 % (ref 37–53)
HDLC SERPL-MCNC: 48 MG/DL (ref 45–?)
HDLC SERPL: 4.21 {RATIO} (ref ?–4.97)
HGB BLD-MCNC: 15.9 G/DL (ref 13–17)
IMMATURE GRANULOCYTE COUNT: 0.02 X10(3) UL (ref 0–1)
IMMATURE GRANULOCYTE RATIO %: 0.3 %
LDLC SERPL CALC-MCNC: 133 MG/DL (ref ?–130)
LYMPHOCYTES # BLD AUTO: 2.53 X10(3) UL (ref 0.9–4)
LYMPHOCYTES NFR BLD AUTO: 37.1 %
M PROTEIN MFR SERPL ELPH: 7.3 G/DL (ref 6.1–8.3)
MCH RBC QN AUTO: 29.4 PG (ref 27–33.2)
MCHC RBC AUTO-ENTMCNC: 32.4 G/DL (ref 31–37)
MCV RBC AUTO: 90.9 FL (ref 80–99)
MICROALBUMIN UR-MCNC: <0.5 MG/DL
MONOCYTES # BLD AUTO: 0.62 X10(3) UL (ref 0.1–1)
MONOCYTES NFR BLD AUTO: 9.1 %
NEUTROPHIL ABS PRELIM: 3.48 X10 (3) UL (ref 1.3–6.7)
NEUTROPHILS # BLD AUTO: 3.48 X10(3) UL (ref 1.3–6.7)
NEUTROPHILS NFR BLD AUTO: 51 %
NONHDLC SERPL-MCNC: 154 MG/DL (ref ?–130)
PLATELET # BLD AUTO: 246 10(3)UL (ref 150–450)
POTASSIUM SERPL-SCNC: 3.8 MMOL/L (ref 3.6–5.1)
RBC # BLD AUTO: 5.4 X10(6)UL (ref 4.3–5.7)
RED CELL DISTRIBUTION WIDTH-SD: 41.4 FL (ref 35.1–46.3)
SODIUM SERPL-SCNC: 138 MMOL/L (ref 136–144)
TRIGL SERPL-MCNC: 107 MG/DL (ref ?–150)
TSI SER-ACNC: 1.81 MIU/ML (ref 0.35–5.5)
VLDLC SERPL CALC-MCNC: 21 MG/DL (ref 5–40)
WBC # BLD AUTO: 6.8 X10(3) UL (ref 4–13)

## 2018-03-03 PROCEDURE — 82043 UR ALBUMIN QUANTITATIVE: CPT

## 2018-03-03 PROCEDURE — 82728 ASSAY OF FERRITIN: CPT

## 2018-03-03 PROCEDURE — 80053 COMPREHEN METABOLIC PANEL: CPT

## 2018-03-03 PROCEDURE — 82570 ASSAY OF URINE CREATININE: CPT

## 2018-03-03 PROCEDURE — 36415 COLL VENOUS BLD VENIPUNCTURE: CPT

## 2018-03-03 PROCEDURE — 83036 HEMOGLOBIN GLYCOSYLATED A1C: CPT

## 2018-03-03 PROCEDURE — 84443 ASSAY THYROID STIM HORMONE: CPT

## 2018-03-03 PROCEDURE — 80061 LIPID PANEL: CPT

## 2018-03-03 PROCEDURE — 85025 COMPLETE CBC W/AUTO DIFF WBC: CPT

## 2018-03-05 ENCOUNTER — OFFICE VISIT (OUTPATIENT)
Dept: FAMILY MEDICINE CLINIC | Facility: CLINIC | Age: 53
End: 2018-03-05

## 2018-03-05 VITALS
DIASTOLIC BLOOD PRESSURE: 84 MMHG | WEIGHT: 234.63 LBS | TEMPERATURE: 98 F | RESPIRATION RATE: 16 BRPM | OXYGEN SATURATION: 99 % | BODY MASS INDEX: 34.36 KG/M2 | HEART RATE: 78 BPM | SYSTOLIC BLOOD PRESSURE: 128 MMHG | HEIGHT: 69.25 IN

## 2018-03-05 DIAGNOSIS — E11.8 TYPE 2 DIABETES MELLITUS WITH COMPLICATION, WITHOUT LONG-TERM CURRENT USE OF INSULIN (HCC): Primary | ICD-10-CM

## 2018-03-05 DIAGNOSIS — E11.9 CONTROLLED TYPE 2 DIABETES MELLITUS WITHOUT COMPLICATION, WITHOUT LONG-TERM CURRENT USE OF INSULIN (HCC): ICD-10-CM

## 2018-03-05 DIAGNOSIS — E78.00 HYPERCHOLESTEROLEMIA: ICD-10-CM

## 2018-03-05 PROCEDURE — 99214 OFFICE O/P EST MOD 30 MIN: CPT | Performed by: FAMILY MEDICINE

## 2018-03-05 RX ORDER — GLIMEPIRIDE 2 MG/1
4 TABLET ORAL
Qty: 180 TABLET | Refills: 1 | Status: SHIPPED | OUTPATIENT
Start: 2018-03-05 | End: 2018-11-26

## 2018-03-05 RX ORDER — METFORMIN HYDROCHLORIDE 500 MG/1
TABLET, EXTENDED RELEASE ORAL
Qty: 270 TABLET | Refills: 1 | Status: SHIPPED | OUTPATIENT
Start: 2018-03-05 | End: 2018-11-26

## 2018-03-05 RX ORDER — ATORVASTATIN CALCIUM 40 MG/1
40 TABLET, FILM COATED ORAL NIGHTLY
Qty: 90 TABLET | Refills: 1 | Status: SHIPPED | OUTPATIENT
Start: 2018-03-05 | End: 2018-11-26

## 2018-03-05 NOTE — PROGRESS NOTES
Oly Mckeon IS A 50year old male HERE FOR Patient presents with:  Diabetes: Diabetic F/U        History of present illness:     Took iron after shoulder surgery a year ago to strengthen bone and healing. 2/20/17.      Occasionally 2 glimepiride with m Cancer Maternal Grandfather      Lung   • Cancer Paternal Grandmother      Brain       Social history:         Social History  Social History   Marital status:   Spouse name: N/A    Years of education: N/A  Number of children: 2     Occupational His 03/03/2018   Component Date Value   • Ferritin 03/03/2018 203.3    • HgbA1C 03/03/2018 6.6*   • Estimated Average Glucose 03/03/2018 143*   • Cholesterol, Total 03/03/2018 202*   • Triglycerides 03/03/2018 107    • HDL Cholesterol 03/03/2018 48    • LDL Ch mellitus with complication, without long-term current use of insulin (HCC)  -     COMP METABOLIC PANEL (14); Future  -     HEMOGLOBIN A1C; Future    Hypercholesterolemia  -     COMP METABOLIC PANEL (14); Future  -     LIPID PANEL;  Future    Controlled type

## 2018-03-05 NOTE — PATIENT INSTRUCTIONS
My review of test results:     PSA, thyroid, liver, kidneys, and iron stores are normal. Good job on A1c, down to 6. 6! Sugar 133 fasting. Total cholesterol slightly high, HDL normal, .  I'd recommend a higher dose of atorvastatin up to 40 mg daily, t

## 2018-03-06 ENCOUNTER — MED REC SCAN ONLY (OUTPATIENT)
Dept: FAMILY MEDICINE CLINIC | Facility: CLINIC | Age: 53
End: 2018-03-06

## 2018-11-21 ENCOUNTER — APPOINTMENT (OUTPATIENT)
Dept: LAB | Age: 53
End: 2018-11-21
Attending: FAMILY MEDICINE
Payer: COMMERCIAL

## 2018-11-21 DIAGNOSIS — E78.00 HYPERCHOLESTEROLEMIA: ICD-10-CM

## 2018-11-21 DIAGNOSIS — E11.8 TYPE 2 DIABETES MELLITUS WITH COMPLICATION, WITHOUT LONG-TERM CURRENT USE OF INSULIN (HCC): ICD-10-CM

## 2018-11-21 PROCEDURE — 83036 HEMOGLOBIN GLYCOSYLATED A1C: CPT

## 2018-11-21 PROCEDURE — 80053 COMPREHEN METABOLIC PANEL: CPT

## 2018-11-21 PROCEDURE — 36415 COLL VENOUS BLD VENIPUNCTURE: CPT

## 2018-11-21 PROCEDURE — 80061 LIPID PANEL: CPT

## 2018-11-26 ENCOUNTER — OFFICE VISIT (OUTPATIENT)
Dept: FAMILY MEDICINE CLINIC | Facility: CLINIC | Age: 53
End: 2018-11-26
Payer: COMMERCIAL

## 2018-11-26 VITALS
WEIGHT: 232 LBS | SYSTOLIC BLOOD PRESSURE: 128 MMHG | BODY MASS INDEX: 33.97 KG/M2 | HEART RATE: 108 BPM | DIASTOLIC BLOOD PRESSURE: 86 MMHG | TEMPERATURE: 99 F | RESPIRATION RATE: 16 BRPM | HEIGHT: 69.25 IN | OXYGEN SATURATION: 98 %

## 2018-11-26 DIAGNOSIS — E78.00 HYPERCHOLESTEROLEMIA: ICD-10-CM

## 2018-11-26 DIAGNOSIS — Z23 NEED FOR VACCINATION: ICD-10-CM

## 2018-11-26 DIAGNOSIS — IMO0001 UNCONTROLLED TYPE 2 DIABETES MELLITUS WITHOUT COMPLICATION, WITHOUT LONG-TERM CURRENT USE OF INSULIN: ICD-10-CM

## 2018-11-26 DIAGNOSIS — K21.9 GASTROESOPHAGEAL REFLUX DISEASE, ESOPHAGITIS PRESENCE NOT SPECIFIED: Primary | ICD-10-CM

## 2018-11-26 PROCEDURE — 99214 OFFICE O/P EST MOD 30 MIN: CPT | Performed by: FAMILY MEDICINE

## 2018-11-26 PROCEDURE — 90471 IMMUNIZATION ADMIN: CPT | Performed by: FAMILY MEDICINE

## 2018-11-26 PROCEDURE — 90686 IIV4 VACC NO PRSV 0.5 ML IM: CPT | Performed by: FAMILY MEDICINE

## 2018-11-26 RX ORDER — FAMOTIDINE 20 MG/1
20 TABLET ORAL NIGHTLY PRN
Qty: 30 TABLET | Refills: 3 | Status: SHIPPED | OUTPATIENT
Start: 2018-11-26 | End: 2019-08-13

## 2018-11-26 RX ORDER — METFORMIN HYDROCHLORIDE 500 MG/1
1000 TABLET, EXTENDED RELEASE ORAL 2 TIMES DAILY WITH MEALS
Qty: 360 TABLET | Refills: 1 | Status: SHIPPED | OUTPATIENT
Start: 2018-11-26 | End: 2018-11-27

## 2018-11-26 RX ORDER — ATORVASTATIN CALCIUM 40 MG/1
40 TABLET, FILM COATED ORAL NIGHTLY
Qty: 90 TABLET | Refills: 1 | Status: SHIPPED | OUTPATIENT
Start: 2018-11-26 | End: 2019-04-26

## 2018-11-26 RX ORDER — GLIMEPIRIDE 2 MG/1
TABLET ORAL
Qty: 270 TABLET | Refills: 1 | Status: SHIPPED | OUTPATIENT
Start: 2018-11-26 | End: 2019-04-26

## 2018-11-26 NOTE — PATIENT INSTRUCTIONS
Increase metformin to 2 pills twice a day  Increase glimepiride to 2 at dinner, 1 with breakfast.    Increase exercise as much as possible. Start back on atorvastatin daily. Need cholesterol control. Recheck labs 3 months then see me.      Discussed

## 2018-11-26 NOTE — PROGRESS NOTES
Justice Urbina IS A 48year old male [address] [facility] Patient presents with:  Diabetes: F/U   Gastro-esophageal Reflux: Mostly at night        History of present illness:     Having reflux only at night when sleeping, awakens him.  Tries Tums to settle stomach, o Rfl:        Allergy:      No Known Allergies    Family history:       Family History   Problem Relation Age of Onset   • Diabetes Father    • Hypertension Father    • Cancer Father 79        stomach   • Colon Polyps Father    • Alcohol and Other Disorders walking 3 miles daily        Bike Helmet: Not Asked        Seat Belt: Not Asked        Self-Exams: Not Asked    Social History Narrative      Children 23 (daughter, , Samantha Boor in [place]) & 12 (son). ([address] [place] [address]). No pets.       R was seen today for diabetes and gastro-esophageal reflux. Diagnoses and all orders for this visit:    Gastroesophageal reflux disease, esophagitis presence not specified  -     famoTIDine 20 MG Oral Tab;  Take 1 tablet (20 mg total) by mouth nightly as n

## 2018-11-27 ENCOUNTER — TELEPHONE (OUTPATIENT)
Dept: FAMILY MEDICINE CLINIC | Facility: CLINIC | Age: 53
End: 2018-11-27

## 2018-11-27 DIAGNOSIS — IMO0001 UNCONTROLLED TYPE 2 DIABETES MELLITUS WITHOUT COMPLICATION, WITHOUT LONG-TERM CURRENT USE OF INSULIN: ICD-10-CM

## 2018-11-27 RX ORDER — METFORMIN HYDROCHLORIDE 500 MG/1
1000 TABLET, EXTENDED RELEASE ORAL 2 TIMES DAILY WITH MEALS
Qty: 360 TABLET | Refills: 1 | Status: SHIPPED | OUTPATIENT
Start: 2018-11-27 | End: 2019-04-26

## 2018-11-27 NOTE — TELEPHONE ENCOUNTER
Dr. Rolf Kelly,  Please advise    Penxy is calling for clarification of this order. Should the patient be taking 3 pills per day or 4 pill per day?      Disp Refills Start End    MetFORMIN HCl  MG Oral Tablet 24 Hr 360 tablet 1 11/26/2018

## 2018-11-27 NOTE — TELEPHONE ENCOUNTER
He should take 4 pills a day, 2 bid. I may not have erased previous order. I sent a clarification new rx to his pharmacy.

## 2019-04-09 ENCOUNTER — PATIENT OUTREACH (OUTPATIENT)
Dept: FAMILY MEDICINE CLINIC | Facility: CLINIC | Age: 54
End: 2019-04-09

## 2019-04-12 ENCOUNTER — LAB ENCOUNTER (OUTPATIENT)
Dept: LAB | Age: 54
End: 2019-04-12
Attending: FAMILY MEDICINE
Payer: COMMERCIAL

## 2019-04-12 DIAGNOSIS — E78.00 HYPERCHOLESTEROLEMIA: ICD-10-CM

## 2019-04-12 DIAGNOSIS — IMO0001 UNCONTROLLED TYPE 2 DIABETES MELLITUS WITHOUT COMPLICATION, WITHOUT LONG-TERM CURRENT USE OF INSULIN: ICD-10-CM

## 2019-04-12 PROCEDURE — 36415 COLL VENOUS BLD VENIPUNCTURE: CPT

## 2019-04-12 PROCEDURE — 80053 COMPREHEN METABOLIC PANEL: CPT

## 2019-04-12 PROCEDURE — 83036 HEMOGLOBIN GLYCOSYLATED A1C: CPT

## 2019-04-12 PROCEDURE — 80061 LIPID PANEL: CPT

## 2019-04-16 ENCOUNTER — TELEPHONE (OUTPATIENT)
Dept: FAMILY MEDICINE CLINIC | Facility: CLINIC | Age: 54
End: 2019-04-16

## 2019-04-17 NOTE — TELEPHONE ENCOUNTER
Detailed VMML for patient and informed of lab test results and need for appt per Dr. Ami Chau note. Also advised urine micro was not obtained. Instructed to go back to the lab and give urine spec. Office number given to schedule appt.

## 2019-04-17 NOTE — TELEPHONE ENCOUNTER
Pt needs appointment. Sugar was 176, A1c is 8.6, so diabetes is uncontrolled. LIver & kidneys are ok, LDL cholesterol slighlty high at 110. Needs appointment to discuss medicaitons.

## 2019-04-18 ENCOUNTER — APPOINTMENT (OUTPATIENT)
Dept: LAB | Age: 54
End: 2019-04-18
Attending: FAMILY MEDICINE
Payer: COMMERCIAL

## 2019-04-18 DIAGNOSIS — IMO0001 UNCONTROLLED TYPE 2 DIABETES MELLITUS WITHOUT COMPLICATION, WITHOUT LONG-TERM CURRENT USE OF INSULIN: ICD-10-CM

## 2019-04-18 DIAGNOSIS — E78.00 HYPERCHOLESTEROLEMIA: ICD-10-CM

## 2019-04-18 PROCEDURE — 82043 UR ALBUMIN QUANTITATIVE: CPT

## 2019-04-18 PROCEDURE — 82570 ASSAY OF URINE CREATININE: CPT

## 2019-04-26 ENCOUNTER — OFFICE VISIT (OUTPATIENT)
Dept: FAMILY MEDICINE CLINIC | Facility: CLINIC | Age: 54
End: 2019-04-26
Payer: COMMERCIAL

## 2019-04-26 VITALS
WEIGHT: 230.38 LBS | HEART RATE: 69 BPM | SYSTOLIC BLOOD PRESSURE: 138 MMHG | RESPIRATION RATE: 17 BRPM | TEMPERATURE: 98 F | OXYGEN SATURATION: 97 % | BODY MASS INDEX: 34.12 KG/M2 | HEIGHT: 68.75 IN | DIASTOLIC BLOOD PRESSURE: 98 MMHG

## 2019-04-26 DIAGNOSIS — E78.00 HYPERCHOLESTEROLEMIA: ICD-10-CM

## 2019-04-26 DIAGNOSIS — IMO0001 UNCONTROLLED TYPE 2 DIABETES MELLITUS WITHOUT COMPLICATION, WITHOUT LONG-TERM CURRENT USE OF INSULIN: ICD-10-CM

## 2019-04-26 PROBLEM — R03.0 ELEVATED BLOOD PRESSURE READING IN OFFICE WITHOUT DIAGNOSIS OF HYPERTENSION: Status: ACTIVE | Noted: 2019-04-26

## 2019-04-26 PROCEDURE — 99214 OFFICE O/P EST MOD 30 MIN: CPT | Performed by: FAMILY MEDICINE

## 2019-04-26 RX ORDER — PIOGLITAZONEHYDROCHLORIDE 15 MG/1
15 TABLET ORAL DAILY
Qty: 30 TABLET | Refills: 2 | Status: SHIPPED | OUTPATIENT
Start: 2019-04-26 | End: 2019-08-13

## 2019-04-26 RX ORDER — LOSARTAN POTASSIUM 50 MG/1
50 TABLET ORAL DAILY
Qty: 30 TABLET | Refills: 2 | Status: SHIPPED | OUTPATIENT
Start: 2019-04-26 | End: 2019-09-17

## 2019-04-26 RX ORDER — GLIMEPIRIDE 2 MG/1
TABLET ORAL
Qty: 270 TABLET | Refills: 1 | Status: SHIPPED | OUTPATIENT
Start: 2019-04-26 | End: 2020-06-05

## 2019-04-26 RX ORDER — ATORVASTATIN CALCIUM 40 MG/1
40 TABLET, FILM COATED ORAL NIGHTLY
Qty: 90 TABLET | Refills: 1 | Status: SHIPPED | OUTPATIENT
Start: 2019-04-26 | End: 2020-09-18

## 2019-04-26 RX ORDER — METFORMIN HYDROCHLORIDE 500 MG/1
1000 TABLET, EXTENDED RELEASE ORAL 2 TIMES DAILY WITH MEALS
Qty: 360 TABLET | Refills: 1 | Status: SHIPPED | OUTPATIENT
Start: 2019-04-26 | End: 2019-08-13

## 2019-04-26 NOTE — PATIENT INSTRUCTIONS
Continue metformin 2 pills twice a day. Glimepiride cut down to 2 mg twice a day. (to avoid low sugar by adding pioglitazone)    Add pioglitazone 15 mg daily.  (watch for swelling)     For blood pressure: add losartan 50 mg daily (usually no side effects

## 2019-04-26 NOTE — PROGRESS NOTES
Tiffany Ashraf IS A 48year old male HERE FOR Patient presents with:  Diabetes: F/U        History of present illness: Today lowest AM reading 163 in awhile, a lot of traveling, eating later. More fast food. Driving also.  Combined family & work trave needed for Heartburn. Disp: 30 tablet Rfl: 3   Glucose Blood (ONETOUCH ULTRA BLUE) In Vitro Strip Use as directed.  Disp: 100 strip Rfl: 1   OneTouch Lancets Does not apply Misc Test once daily Disp: 100 each Rfl: 3   Multiple Vitamins-Minerals (MULTIVITAMI Active member of club or organization: Not on file        Attends meetings of clubs or organizations: Not on file        Relationship status: Not on file      Intimate partner violence:        Fear of current or ex partner: Not on file        Emotionally 04/12/2019   Component Date Value   • Glucose 04/12/2019 176*   • Sodium 04/12/2019 137    • Potassium 04/12/2019 4.1    • Chloride 04/12/2019 103    • CO2 04/12/2019 27.0    • Anion Gap 04/12/2019 7    • BUN 04/12/2019 14    • Creatinine 04/12/2019 1.13 pioglitazone)    Add pioglitazone 15 mg daily. (watch for swelling)     For blood pressure: add losartan 50 mg daily (usually no side effects)   Continue atorvastatin. Recheck labs in 3 months.      Strongly encourage regular physical activity, as you'v

## 2019-07-16 DIAGNOSIS — IMO0001 UNCONTROLLED TYPE 2 DIABETES MELLITUS WITHOUT COMPLICATION, WITHOUT LONG-TERM CURRENT USE OF INSULIN: ICD-10-CM

## 2019-07-18 NOTE — TELEPHONE ENCOUNTER
LOV 4/19    LAST LAB 4/19    LAST RX   Glucose Blood (ONETOUCH ULTRA BLUE) In Vitro Strip 100 strip 1 11/26/2018        Next OV Visit date not found      PROTOCOL  Diabetic Supplies Protocol Passed    Refilled x 3 months.

## 2019-08-09 ENCOUNTER — APPOINTMENT (OUTPATIENT)
Dept: LAB | Age: 54
End: 2019-08-09
Attending: FAMILY MEDICINE
Payer: COMMERCIAL

## 2019-08-09 DIAGNOSIS — E78.00 HYPERCHOLESTEROLEMIA: ICD-10-CM

## 2019-08-09 DIAGNOSIS — IMO0001 UNCONTROLLED TYPE 2 DIABETES MELLITUS WITHOUT COMPLICATION, WITHOUT LONG-TERM CURRENT USE OF INSULIN: ICD-10-CM

## 2019-08-09 LAB
ALBUMIN SERPL-MCNC: 3.7 G/DL (ref 3.4–5)
ALBUMIN/GLOB SERPL: 1.1 {RATIO} (ref 1–2)
ALP LIVER SERPL-CCNC: 86 U/L (ref 45–117)
ALT SERPL-CCNC: 21 U/L (ref 16–61)
ANION GAP SERPL CALC-SCNC: 5 MMOL/L (ref 0–18)
AST SERPL-CCNC: 22 U/L (ref 15–37)
BILIRUB SERPL-MCNC: 0.5 MG/DL (ref 0.1–2)
BUN BLD-MCNC: 18 MG/DL (ref 7–18)
BUN/CREAT SERPL: 14.4 (ref 10–20)
CALCIUM BLD-MCNC: 9.1 MG/DL (ref 8.5–10.1)
CHLORIDE SERPL-SCNC: 104 MMOL/L (ref 98–112)
CHOLEST SMN-MCNC: 136 MG/DL (ref ?–200)
CO2 SERPL-SCNC: 27 MMOL/L (ref 21–32)
CREAT BLD-MCNC: 1.25 MG/DL (ref 0.7–1.3)
EST. AVERAGE GLUCOSE BLD GHB EST-MCNC: 140 MG/DL (ref 68–126)
GLOBULIN PLAS-MCNC: 3.4 G/DL (ref 2.8–4.4)
GLUCOSE BLD-MCNC: 146 MG/DL (ref 70–99)
HBA1C MFR BLD HPLC: 6.5 % (ref ?–5.7)
HDLC SERPL-MCNC: 40 MG/DL (ref 40–59)
LDLC SERPL CALC-MCNC: 83 MG/DL (ref ?–100)
M PROTEIN MFR SERPL ELPH: 7.1 G/DL (ref 6.4–8.2)
NONHDLC SERPL-MCNC: 96 MG/DL (ref ?–130)
OSMOLALITY SERPL CALC.SUM OF ELEC: 287 MOSM/KG (ref 275–295)
POTASSIUM SERPL-SCNC: 4.1 MMOL/L (ref 3.5–5.1)
SODIUM SERPL-SCNC: 136 MMOL/L (ref 136–145)
TRIGL SERPL-MCNC: 65 MG/DL (ref 30–149)
VLDLC SERPL CALC-MCNC: 13 MG/DL (ref 0–30)

## 2019-08-09 PROCEDURE — 80053 COMPREHEN METABOLIC PANEL: CPT

## 2019-08-09 PROCEDURE — 80061 LIPID PANEL: CPT

## 2019-08-09 PROCEDURE — 36415 COLL VENOUS BLD VENIPUNCTURE: CPT

## 2019-08-09 PROCEDURE — 83036 HEMOGLOBIN GLYCOSYLATED A1C: CPT

## 2019-08-13 ENCOUNTER — OFFICE VISIT (OUTPATIENT)
Dept: FAMILY MEDICINE CLINIC | Facility: CLINIC | Age: 54
End: 2019-08-13
Payer: COMMERCIAL

## 2019-08-13 VITALS
RESPIRATION RATE: 18 BRPM | OXYGEN SATURATION: 97 % | BODY MASS INDEX: 33.47 KG/M2 | WEIGHT: 226 LBS | SYSTOLIC BLOOD PRESSURE: 126 MMHG | DIASTOLIC BLOOD PRESSURE: 74 MMHG | HEART RATE: 85 BPM | TEMPERATURE: 98 F | HEIGHT: 68.75 IN

## 2019-08-13 DIAGNOSIS — E11.649 CONTROLLED TYPE 2 DIABETES MELLITUS WITH HYPOGLYCEMIA, WITHOUT LONG-TERM CURRENT USE OF INSULIN (HCC): Primary | ICD-10-CM

## 2019-08-13 DIAGNOSIS — E78.00 HYPERCHOLESTEROLEMIA: ICD-10-CM

## 2019-08-13 PROCEDURE — 99214 OFFICE O/P EST MOD 30 MIN: CPT | Performed by: FAMILY MEDICINE

## 2019-08-13 RX ORDER — PIOGLITAZONEHYDROCHLORIDE 15 MG/1
15 TABLET ORAL DAILY
Qty: 90 TABLET | Refills: 1 | Status: SHIPPED | OUTPATIENT
Start: 2019-08-13 | End: 2020-02-14

## 2019-08-13 RX ORDER — METFORMIN HYDROCHLORIDE 500 MG/1
1000 TABLET, EXTENDED RELEASE ORAL 2 TIMES DAILY WITH MEALS
Qty: 360 TABLET | Refills: 1 | Status: SHIPPED | OUTPATIENT
Start: 2019-08-13 | End: 2020-06-05

## 2019-08-13 NOTE — PATIENT INSTRUCTIONS
Acetominophen 500 mg and ibuprofen 200 mg together every 4-6 hours should help for pain. Ice, wrist or counterforce brace splints may help for pain right elbow.    Continue current meds, check sugar if you feel \"off\" (either high or low) or else about 2 d · Injections of medicine. This may relieve symptoms. If other treatments do not relieve symptoms, you may need surgery. Possible complications  If you don’t give your elbow time to heal, symptoms may return or get worse.  Follow your healthcare provider’s

## 2019-08-13 NOTE — PROGRESS NOTES
Eimly Sun IS A 48year old male HERE FOR Patient presents with:  Diabetes  Elbow Pain: right elbow pain started today       History of present illness:     Renovation at home, moving a lot around, active. Vacation to [address].    Can feel when h Multiple Vitamins-Minerals (MULTIVITAMIN OR) Take 1 tablet by mouth daily. Disp:  Rfl:    Glucose Blood (ONETOUCH ULTRA BLUE) In Vitro Strip Test once daily.  Disp: 100 strip Rfl: 1   Losartan Potassium 50 MG Oral Tab Take 1 tablet (50 mg total) by mouth da Active member of club or organization: Not on file        Attends meetings of clubs or organizations: Not on file        Relationship status: Not on file      Intimate partner violence:        Fear of current or ex partner: Not on file        Emotion • CO2 08/09/2019 27.0    • Anion Gap 08/09/2019 5    • BUN 08/09/2019 18    • Creatinine 08/09/2019 1.25    • BUN/CREA Ratio 08/09/2019 14.4    • Calcium, Total 08/09/2019 9.1    • Calculated Osmolality 08/09/2019 287    • GFR, Non- 08/09/2 • Estimated Average Glucose 04/12/2019 200*   Appointment on 11/21/2018   Component Date Value   • Glucose 11/21/2018 153*   • Sodium 11/21/2018 139    • Potassium 11/21/2018 3.8    • Chloride 11/21/2018 105    • CO2 11/21/2018 25.0    • Anion Gap 11/21/20 Continue current meds, check sugar if you feel \"off\" (either high or low) or else about 2 days a week, fasting and 2 hours after a meal.    Recheck in 6 months on diabetes.   Understanding Medial Epicondylitis    Several muscles attach to the arm at the e If you don’t give your elbow time to heal, symptoms may return or get worse.  Follow your healthcare provider’s instructions on resting and treating your elbow.     When to call your healthcare provider  Call your healthcare provider right away if you have

## 2019-08-14 ENCOUNTER — TELEPHONE (OUTPATIENT)
Dept: FAMILY MEDICINE CLINIC | Facility: CLINIC | Age: 54
End: 2019-08-14

## 2019-08-14 DIAGNOSIS — IMO0001 UNCONTROLLED TYPE 2 DIABETES MELLITUS WITHOUT COMPLICATION, WITHOUT LONG-TERM CURRENT USE OF INSULIN: ICD-10-CM

## 2019-08-14 NOTE — TELEPHONE ENCOUNTER
Pt said pharmacy told him they could not fill his Test Strips because directions for use were not on Rx

## 2019-09-16 NOTE — TELEPHONE ENCOUNTER
Patient states he saw Dr Conrad Hodgson on 8.13.19 and she didn't say if she planned to keep him on his BP medication. He is out of refills and isn't sure if she wants him to continue taking? He states he monitored it over the weekend and it's been running high.

## 2019-09-17 RX ORDER — LOSARTAN POTASSIUM 50 MG/1
50 TABLET ORAL DAILY
Qty: 30 TABLET | Refills: 2 | Status: SHIPPED | OUTPATIENT
Start: 2019-09-17 | End: 2020-01-02

## 2019-09-17 NOTE — TELEPHONE ENCOUNTER
LOV 8/19     LAST LAB 8/19    LAST RX   Losartan Potassium 50 MG Oral Tab 30 tablet 2 4/26/2019         Next OV Visit date not found      PROTOCOL  Hypertension Medications Protocol Passed    Refilled x 3 months.

## 2019-09-17 NOTE — TELEPHONE ENCOUNTER
Pt called again to ck status of refill on BP meds, said his BP has been running high, pt did not know name of medication he was previously on?

## 2019-12-31 DIAGNOSIS — E11.649 CONTROLLED TYPE 2 DIABETES MELLITUS WITH HYPOGLYCEMIA, WITHOUT LONG-TERM CURRENT USE OF INSULIN (HCC): ICD-10-CM

## 2020-01-02 ENCOUNTER — OFFICE VISIT (OUTPATIENT)
Dept: FAMILY MEDICINE CLINIC | Facility: CLINIC | Age: 55
End: 2020-01-02
Payer: COMMERCIAL

## 2020-01-02 VITALS
HEIGHT: 69 IN | OXYGEN SATURATION: 97 % | DIASTOLIC BLOOD PRESSURE: 80 MMHG | HEART RATE: 92 BPM | WEIGHT: 240 LBS | SYSTOLIC BLOOD PRESSURE: 110 MMHG | RESPIRATION RATE: 16 BRPM | BODY MASS INDEX: 35.55 KG/M2 | TEMPERATURE: 99 F

## 2020-01-02 DIAGNOSIS — E78.00 HYPERCHOLESTEROLEMIA: ICD-10-CM

## 2020-01-02 DIAGNOSIS — E11.649 CONTROLLED TYPE 2 DIABETES MELLITUS WITH HYPOGLYCEMIA, WITHOUT LONG-TERM CURRENT USE OF INSULIN (HCC): ICD-10-CM

## 2020-01-02 DIAGNOSIS — R03.0 ELEVATED BLOOD PRESSURE READING IN OFFICE WITHOUT DIAGNOSIS OF HYPERTENSION: ICD-10-CM

## 2020-01-02 DIAGNOSIS — J06.9 VIRAL URI WITH COUGH: Primary | ICD-10-CM

## 2020-01-02 PROCEDURE — 99213 OFFICE O/P EST LOW 20 MIN: CPT | Performed by: FAMILY MEDICINE

## 2020-01-02 RX ORDER — GLIMEPIRIDE 2 MG/1
TABLET ORAL
Qty: 90 TABLET | Refills: 0 | OUTPATIENT
Start: 2020-01-02

## 2020-01-02 RX ORDER — GLIMEPIRIDE 2 MG/1
TABLET ORAL
Qty: 270 TABLET | Refills: 1 | OUTPATIENT
Start: 2020-01-02

## 2020-01-02 RX ORDER — LOSARTAN POTASSIUM 50 MG/1
50 TABLET ORAL DAILY
Qty: 90 TABLET | Refills: 1 | Status: SHIPPED | OUTPATIENT
Start: 2020-01-02 | End: 2020-06-05

## 2020-01-02 RX ORDER — LOSARTAN POTASSIUM 50 MG/1
TABLET ORAL
Qty: 30 TABLET | Refills: 0 | OUTPATIENT
Start: 2020-01-02

## 2020-01-02 NOTE — TELEPHONE ENCOUNTER
Future Appointments   Date Time Provider Florinda Karen   1/2/2020  3:40 PM Mahogany Castillo MD [facility]     Denied refill at appt.

## 2020-01-02 NOTE — TELEPHONE ENCOUNTER
Rx denied patient has an appt.      Future Appointments   Date Time Provider Florinda Jones   1/2/2020  3:40 PM Brain Nevarez MD [facility]

## 2020-01-02 NOTE — PROGRESS NOTES
Monty Lehman IS A 48year old male HERE FOR Patient presents with:  Sore Throat: cold symptoms. History of present illness:     Ill x 3 d, can only sleep 2 hours. Sore throat, trouble swallowing, sinus congestion drains to throat, cough.  Helyn Gal Allergy:      No Known Allergies    Family history:       Family History   Problem Relation Age of Onset   • Diabetes Father    • Hypertension Father    • Cancer Father 79        stomach   • Colon Polyps Father    • Alcohol and Other Disorders Associ Physically abused: Not on file        Forced sexual activity: Not on file    Other Topics      Concerns:         Service: No        Blood Transfusions: No        Caffeine Concern: Yes          1-2 cups coffee daily, 1 can diet soda daily         Oc Activated; Inhale 2 puffs into the lungs every 4 (four) hours as needed (cough or wheezing). Patient Instructions   Sinus infections can get better either with or without antibiotics.  Generally we prefer to wait at least a week to see if symptoms

## 2020-01-31 ENCOUNTER — APPOINTMENT (OUTPATIENT)
Dept: LAB | Age: 55
End: 2020-01-31
Attending: FAMILY MEDICINE
Payer: COMMERCIAL

## 2020-01-31 LAB
ALBUMIN SERPL-MCNC: 3.7 G/DL (ref 3.4–5)
ALBUMIN/GLOB SERPL: 1 {RATIO} (ref 1–2)
ALP LIVER SERPL-CCNC: 75 U/L (ref 45–117)
ALT SERPL-CCNC: 25 U/L (ref 16–61)
ANION GAP SERPL CALC-SCNC: 5 MMOL/L (ref 0–18)
AST SERPL-CCNC: 16 U/L (ref 15–37)
BILIRUB SERPL-MCNC: 0.8 MG/DL (ref 0.1–2)
BUN BLD-MCNC: 15 MG/DL (ref 7–18)
BUN/CREAT SERPL: 13.4 (ref 10–20)
CALCIUM BLD-MCNC: 8.5 MG/DL (ref 8.5–10.1)
CHLORIDE SERPL-SCNC: 105 MMOL/L (ref 98–112)
CHOLEST SMN-MCNC: 130 MG/DL (ref ?–200)
CO2 SERPL-SCNC: 30 MMOL/L (ref 21–32)
CREAT BLD-MCNC: 1.12 MG/DL (ref 0.7–1.3)
CREAT UR-SCNC: 124 MG/DL
EST. AVERAGE GLUCOSE BLD GHB EST-MCNC: 151 MG/DL (ref 68–126)
GLOBULIN PLAS-MCNC: 3.6 G/DL (ref 2.8–4.4)
GLUCOSE BLD-MCNC: 178 MG/DL (ref 70–99)
HBA1C MFR BLD HPLC: 6.9 % (ref ?–5.7)
HDLC SERPL-MCNC: 48 MG/DL (ref 40–59)
LDLC SERPL CALC-MCNC: 68 MG/DL (ref ?–100)
M PROTEIN MFR SERPL ELPH: 7.3 G/DL (ref 6.4–8.2)
MICROALBUMIN UR-MCNC: <0.5 MG/DL
NONHDLC SERPL-MCNC: 82 MG/DL (ref ?–130)
OSMOLALITY SERPL CALC.SUM OF ELEC: 295 MOSM/KG (ref 275–295)
PATIENT FASTING Y/N/NP: YES
PATIENT FASTING Y/N/NP: YES
POTASSIUM SERPL-SCNC: 4 MMOL/L (ref 3.5–5.1)
SODIUM SERPL-SCNC: 140 MMOL/L (ref 136–145)
TRIGL SERPL-MCNC: 71 MG/DL (ref 30–149)
VLDLC SERPL CALC-MCNC: 14 MG/DL (ref 0–30)

## 2020-01-31 PROCEDURE — 82043 UR ALBUMIN QUANTITATIVE: CPT | Performed by: FAMILY MEDICINE

## 2020-01-31 PROCEDURE — 80053 COMPREHEN METABOLIC PANEL: CPT | Performed by: FAMILY MEDICINE

## 2020-01-31 PROCEDURE — 80061 LIPID PANEL: CPT | Performed by: FAMILY MEDICINE

## 2020-01-31 PROCEDURE — 36415 COLL VENOUS BLD VENIPUNCTURE: CPT | Performed by: FAMILY MEDICINE

## 2020-01-31 PROCEDURE — 82570 ASSAY OF URINE CREATININE: CPT | Performed by: FAMILY MEDICINE

## 2020-01-31 PROCEDURE — 83036 HEMOGLOBIN GLYCOSYLATED A1C: CPT | Performed by: FAMILY MEDICINE

## 2020-02-14 ENCOUNTER — OFFICE VISIT (OUTPATIENT)
Dept: FAMILY MEDICINE CLINIC | Facility: CLINIC | Age: 55
End: 2020-02-14
Payer: COMMERCIAL

## 2020-02-14 VITALS
HEIGHT: 69 IN | DIASTOLIC BLOOD PRESSURE: 82 MMHG | WEIGHT: 240.81 LBS | BODY MASS INDEX: 35.67 KG/M2 | TEMPERATURE: 98 F | SYSTOLIC BLOOD PRESSURE: 124 MMHG | HEART RATE: 66 BPM | RESPIRATION RATE: 16 BRPM | OXYGEN SATURATION: 97 %

## 2020-02-14 DIAGNOSIS — E11.649 CONTROLLED TYPE 2 DIABETES MELLITUS WITH HYPOGLYCEMIA, WITHOUT LONG-TERM CURRENT USE OF INSULIN (HCC): ICD-10-CM

## 2020-02-14 DIAGNOSIS — Z23 NEED FOR VACCINATION: ICD-10-CM

## 2020-02-14 DIAGNOSIS — E78.00 HYPERCHOLESTEROLEMIA: ICD-10-CM

## 2020-02-14 DIAGNOSIS — Z13.0 SCREENING FOR DEFICIENCY ANEMIA: ICD-10-CM

## 2020-02-14 DIAGNOSIS — Z23 NEED FOR PROPHYLACTIC VACCINATION AGAINST STREPTOCOCCUS PNEUMONIAE (PNEUMOCOCCUS): ICD-10-CM

## 2020-02-14 DIAGNOSIS — Z12.5 ENCOUNTER FOR PROSTATE CANCER SCREENING: Primary | ICD-10-CM

## 2020-02-14 DIAGNOSIS — Z13.29 SCREENING FOR THYROID DISORDER: ICD-10-CM

## 2020-02-14 PROCEDURE — 99214 OFFICE O/P EST MOD 30 MIN: CPT | Performed by: FAMILY MEDICINE

## 2020-02-14 PROCEDURE — 90686 IIV4 VACC NO PRSV 0.5 ML IM: CPT | Performed by: FAMILY MEDICINE

## 2020-02-14 PROCEDURE — 90471 IMMUNIZATION ADMIN: CPT | Performed by: FAMILY MEDICINE

## 2020-02-14 PROCEDURE — 90732 PPSV23 VACC 2 YRS+ SUBQ/IM: CPT | Performed by: FAMILY MEDICINE

## 2020-02-14 PROCEDURE — 90472 IMMUNIZATION ADMIN EACH ADD: CPT | Performed by: FAMILY MEDICINE

## 2020-02-14 RX ORDER — PIOGLITAZONEHYDROCHLORIDE 15 MG/1
15 TABLET ORAL DAILY
Qty: 90 TABLET | Refills: 1 | Status: SHIPPED | OUTPATIENT
Start: 2020-02-14 | End: 2020-09-18

## 2020-02-14 NOTE — PATIENT INSTRUCTIONS
Try for more physical activity. Recheck in 3 months. Physical & diabetic check. Labs in advance. Check with GI next year about upper abdominal scope, sooner if frequent heartburn that doesn't respond to pepcid or diet or weight changes.  Or if unexpl

## 2020-02-14 NOTE — PROGRESS NOTES
Gaby Espino IS A 48year old male HERE FOR Patient presents with:  Diabetes       History of present illness:     Has to go to office more for work since around holiday time, can't walk around as much. Unhealthy snacks close at hand.  Sits a lot for th Father    • Cancer Father 79        stomach   • Colon Polyps Father    • Alcohol and Other Disorders Associated Father    • Hypertension Mother    • Hypertension Maternal Grandmother    • Diabetes Maternal Grandmother    • Cancer Maternal Grandfather Other Topics      Concerns:         Service: No        Blood Transfusions: No        Caffeine Concern: Yes          1-2 cups coffee daily, 1 can diet soda daily         Occupational Exposure: No        Hobby Hazards: No        Sleep Concern:  Yes Date Value   • Glucose 01/31/2020 178*   • Sodium 01/31/2020 140    • Potassium 01/31/2020 4.0    • Chloride 01/31/2020 105    • CO2 01/31/2020 30.0    • Anion Gap 01/31/2020 5    • BUN 01/31/2020 15    • Creatinine 01/31/2020 1.12    • BUN/CREA Ratio 01/3 vaccination  -     FLULAVAL INFLUENZA VACCINE QUAD PRESERVATIVE FREE 0.5 ML          Patient Instructions   Try for more physical activity. Recheck in 3 months. Physical & diabetic check. Labs in advance.      Check with GI next year about upper abdomina

## 2020-06-04 ENCOUNTER — LAB ENCOUNTER (OUTPATIENT)
Dept: LAB | Age: 55
End: 2020-06-04
Attending: FAMILY MEDICINE
Payer: COMMERCIAL

## 2020-06-04 DIAGNOSIS — Z12.5 ENCOUNTER FOR PROSTATE CANCER SCREENING: ICD-10-CM

## 2020-06-04 LAB — COMPLEXED PSA SERPL-MCNC: 0.51 NG/ML (ref ?–4)

## 2020-06-04 PROCEDURE — 85025 COMPLETE CBC W/AUTO DIFF WBC: CPT | Performed by: FAMILY MEDICINE

## 2020-06-04 PROCEDURE — 83036 HEMOGLOBIN GLYCOSYLATED A1C: CPT | Performed by: FAMILY MEDICINE

## 2020-06-04 PROCEDURE — 80061 LIPID PANEL: CPT | Performed by: FAMILY MEDICINE

## 2020-06-04 PROCEDURE — 36415 COLL VENOUS BLD VENIPUNCTURE: CPT | Performed by: FAMILY MEDICINE

## 2020-06-04 PROCEDURE — 84443 ASSAY THYROID STIM HORMONE: CPT | Performed by: FAMILY MEDICINE

## 2020-06-04 PROCEDURE — 80053 COMPREHEN METABOLIC PANEL: CPT | Performed by: FAMILY MEDICINE

## 2020-06-05 ENCOUNTER — OFFICE VISIT (OUTPATIENT)
Dept: FAMILY MEDICINE CLINIC | Facility: CLINIC | Age: 55
End: 2020-06-05
Payer: COMMERCIAL

## 2020-06-05 ENCOUNTER — TELEPHONE (OUTPATIENT)
Dept: FAMILY MEDICINE CLINIC | Facility: CLINIC | Age: 55
End: 2020-06-05

## 2020-06-05 VITALS
BODY MASS INDEX: 34.51 KG/M2 | RESPIRATION RATE: 16 BRPM | TEMPERATURE: 98 F | HEART RATE: 75 BPM | OXYGEN SATURATION: 99 % | SYSTOLIC BLOOD PRESSURE: 130 MMHG | HEIGHT: 69 IN | WEIGHT: 233 LBS | DIASTOLIC BLOOD PRESSURE: 90 MMHG

## 2020-06-05 DIAGNOSIS — E11.649 CONTROLLED TYPE 2 DIABETES MELLITUS WITH HYPOGLYCEMIA, WITHOUT LONG-TERM CURRENT USE OF INSULIN (HCC): ICD-10-CM

## 2020-06-05 DIAGNOSIS — E78.00 HYPERCHOLESTEROLEMIA: Primary | ICD-10-CM

## 2020-06-05 DIAGNOSIS — R03.0 ELEVATED BLOOD PRESSURE READING IN OFFICE WITHOUT DIAGNOSIS OF HYPERTENSION: ICD-10-CM

## 2020-06-05 DIAGNOSIS — E11.9 TYPE 2 DIABETES MELLITUS WITHOUT COMPLICATION, WITHOUT LONG-TERM CURRENT USE OF INSULIN (HCC): ICD-10-CM

## 2020-06-05 PROBLEM — I10 ESSENTIAL HYPERTENSION: Status: ACTIVE | Noted: 2020-06-05

## 2020-06-05 PROCEDURE — 99214 OFFICE O/P EST MOD 30 MIN: CPT | Performed by: FAMILY MEDICINE

## 2020-06-05 RX ORDER — METFORMIN HYDROCHLORIDE 500 MG/1
1000 TABLET, EXTENDED RELEASE ORAL 2 TIMES DAILY WITH MEALS
Qty: 360 TABLET | Refills: 1 | Status: SHIPPED | OUTPATIENT
Start: 2020-06-05 | End: 2020-06-08 | Stop reason: RX

## 2020-06-05 RX ORDER — GLIMEPIRIDE 2 MG/1
TABLET ORAL
Qty: 270 TABLET | Refills: 1 | Status: SHIPPED | OUTPATIENT
Start: 2020-06-05 | End: 2021-05-10

## 2020-06-05 RX ORDER — LOSARTAN POTASSIUM 50 MG/1
50 TABLET ORAL DAILY
Qty: 90 TABLET | Refills: 1 | Status: SHIPPED | OUTPATIENT
Start: 2020-06-05 | End: 2021-04-16

## 2020-06-05 NOTE — TELEPHONE ENCOUNTER
Pharmacy stated Metformin HCI ER has been recalled. Asking if it can be changed to regular Metformin or something else? Please advise.

## 2020-06-05 NOTE — PROGRESS NOTES
Laina Muñoz IS A 48year old male HERE FOR Patient presents with:  Diabetes: palak f/u. History of present illness:     More carbs at home and ran out of glimepiride a month ago. Is active, no chest pain or dyspnea.  No hypoglycemic reaction Father    • Hypertension Mother    • Hypertension Maternal Grandmother    • Diabetes Maternal Grandmother    • Cancer Maternal Grandfather         Lung   • Cancer Paternal Grandmother         Brain   • Other (Other) Sister         [place] [facility] [place]          1-2 cups coffee daily, 1 can diet soda daily         Occupational Exposure: No        Hobby Hazards: No        Sleep Concern: Yes          up at least once/night, sometimes x 2, hard to get back asleep        Stress Concern: No        Weight C 24    • Alkaline Phosphatase 06/04/2020 78    • Bilirubin, Total 06/04/2020 0.8    • Total Protein 06/04/2020 7.6    • Albumin 06/04/2020 3.8    • Globulin  06/04/2020 3.8    • A/G Ratio 06/04/2020 1.0    • FASTING 06/04/2020 Yes    • Cholesterol, Total 06 Albumin 01/31/2020 3.7    • Globulin  01/31/2020 3.6    • A/G Ratio 01/31/2020 1.0    • FASTING 01/31/2020 Yes    • Cholesterol, Total 01/31/2020 130    • HDL Cholesterol 01/31/2020 48    • Triglycerides 01/31/2020 71    • LDL Cholesterol 01/31/2020 68 without diagnosis of hypertension          There are no Patient Instructions on file for this visit.

## 2020-06-05 NOTE — PATIENT INSTRUCTIONS
Get back on glimepiride 2 with dinner, 1 in morning. Continue other meds. Decrease dietary carbs. Stay active     Monitor blood pressure, if running consistently over 90 diastolic may need to increase losartan to 100 mg daily.     Labs in 3 months befor

## 2020-06-08 ENCOUNTER — TELEPHONE (OUTPATIENT)
Dept: FAMILY MEDICINE CLINIC | Facility: CLINIC | Age: 55
End: 2020-06-08

## 2020-06-08 NOTE — TELEPHONE ENCOUNTER
Rx sent. metFORMIN HCl  MG Oral Tablet 24 Hr 360 tablet 1 6/5/2020    Sig:   Take 2 tablets (1,000 mg total) by mouth 2 (two) times daily with meals.      Route:   Oral     Note to Pharmacy:   Corrected order, we are increasing to 1000 mg twice

## 2020-06-09 ENCOUNTER — TELEPHONE (OUTPATIENT)
Dept: FAMILY MEDICINE CLINIC | Facility: CLINIC | Age: 55
End: 2020-06-09

## 2020-06-10 ENCOUNTER — MED REC SCAN ONLY (OUTPATIENT)
Dept: FAMILY MEDICINE CLINIC | Facility: CLINIC | Age: 55
End: 2020-06-10

## 2020-08-24 RX ORDER — FAMOTIDINE 20 MG/1
TABLET, FILM COATED ORAL
Qty: 90 TABLET | Refills: 0 | Status: SHIPPED | OUTPATIENT
Start: 2020-08-24 | End: 2021-03-16 | Stop reason: ALTCHOICE

## 2020-09-18 DIAGNOSIS — E78.00 HYPERCHOLESTEROLEMIA: ICD-10-CM

## 2020-09-18 DIAGNOSIS — E11.649 CONTROLLED TYPE 2 DIABETES MELLITUS WITH HYPOGLYCEMIA, WITHOUT LONG-TERM CURRENT USE OF INSULIN (HCC): ICD-10-CM

## 2020-09-18 RX ORDER — PIOGLITAZONEHYDROCHLORIDE 15 MG/1
TABLET ORAL
Qty: 30 TABLET | Refills: 0 | Status: SHIPPED | OUTPATIENT
Start: 2020-09-18 | End: 2021-02-02

## 2020-09-18 RX ORDER — ATORVASTATIN CALCIUM 40 MG/1
TABLET, FILM COATED ORAL
Qty: 30 TABLET | Refills: 0 | Status: SHIPPED | OUTPATIENT
Start: 2020-09-18 | End: 2021-01-22

## 2020-11-09 ENCOUNTER — APPOINTMENT (OUTPATIENT)
Dept: LAB | Age: 55
End: 2020-11-09
Attending: FAMILY MEDICINE
Payer: COMMERCIAL

## 2020-11-09 PROCEDURE — 36415 COLL VENOUS BLD VENIPUNCTURE: CPT | Performed by: FAMILY MEDICINE

## 2020-11-09 PROCEDURE — 80061 LIPID PANEL: CPT | Performed by: FAMILY MEDICINE

## 2020-11-09 PROCEDURE — 83036 HEMOGLOBIN GLYCOSYLATED A1C: CPT | Performed by: FAMILY MEDICINE

## 2020-11-09 PROCEDURE — 80053 COMPREHEN METABOLIC PANEL: CPT | Performed by: FAMILY MEDICINE

## 2021-01-22 DIAGNOSIS — E78.00 HYPERCHOLESTEROLEMIA: ICD-10-CM

## 2021-01-22 DIAGNOSIS — E11.649 CONTROLLED TYPE 2 DIABETES MELLITUS WITH HYPOGLYCEMIA, WITHOUT LONG-TERM CURRENT USE OF INSULIN (HCC): ICD-10-CM

## 2021-01-22 RX ORDER — ATORVASTATIN CALCIUM 40 MG/1
TABLET, FILM COATED ORAL
Qty: 30 TABLET | Refills: 0 | Status: SHIPPED | OUTPATIENT
Start: 2021-01-22 | End: 2021-11-30

## 2021-01-22 NOTE — TELEPHONE ENCOUNTER
Cholesterol Medication Protocol [id number]/22/2021 12:59 PM   ALT < 80 Protocol Details    ALT resulted within past year     Lipid panel within past 12 months     Appointment within past 12 or next 3 months      LOV 6/5/20     LAST LAB  11/9/20      LAST RX  9/18/20  30 tabs     Next OV   Future Appointments   Date Time Provider \[facility] [place] [facility]\""   2/24/2021  7:30 AM Yamini Bruce MD [address] [place] [address] [facility]         PROTOCOL pass     Diabetic Medication Protocol [id number]/22/2021 01:23 PM   Appointment in past 6 or next 3 months Protocol Details    HgBA1C procedure resulted in past 6 months     Last HgBA1C < 7.5     Microalbumin procedure in past 12 months or taking ACE/ARB      LOV 6/5/20     LAST LAB 11/9/20     LAST RX   9/18/20 30 tabs     Next OV   Future Appointments   Date Time Provider Florinda Sandersi   2/24/2021  7:30 AM Yamini Bruce MD [address] [place] [address] [facility]         PROTOCOL failed   Please schedule medication f/u with Dr Ramírez Howard .

## 2021-02-02 RX ORDER — PIOGLITAZONEHYDROCHLORIDE 15 MG/1
TABLET ORAL
Qty: 90 TABLET | Refills: 0 | Status: SHIPPED | OUTPATIENT
Start: 2021-02-02 | End: 2022-02-28 | Stop reason: ALTCHOICE

## 2021-02-21 ENCOUNTER — LAB ENCOUNTER (OUTPATIENT)
Dept: LAB | Facility: HOSPITAL | Age: 56
End: 2021-02-21
Attending: INTERNAL MEDICINE
Payer: COMMERCIAL

## 2021-02-21 DIAGNOSIS — Z01.818 PRE-OP TESTING: ICD-10-CM

## 2021-02-21 LAB — SARS-COV-2 RNA RESP QL NAA+PROBE: NOT DETECTED

## 2021-02-24 PROBLEM — K63.5 POLYP OF COLON: Status: ACTIVE | Noted: 2021-02-24

## 2021-02-24 PROBLEM — D12.2 BENIGN NEOPLASM OF ASCENDING COLON: Status: ACTIVE | Noted: 2021-02-24

## 2021-02-24 PROBLEM — Z86.010 HISTORY OF ADENOMATOUS POLYP OF COLON: Status: ACTIVE | Noted: 2021-02-24

## 2021-02-24 PROBLEM — Z86.0101 HISTORY OF ADENOMATOUS POLYP OF COLON: Status: ACTIVE | Noted: 2021-02-24

## 2021-03-12 ENCOUNTER — LAB ENCOUNTER (OUTPATIENT)
Dept: LAB | Age: 56
End: 2021-03-12
Attending: INTERNAL MEDICINE
Payer: COMMERCIAL

## 2021-03-12 DIAGNOSIS — Z01.818 PRE-OP TESTING: ICD-10-CM

## 2021-03-12 LAB — SARS-COV-2 RNA RESP QL NAA+PROBE: NOT DETECTED

## 2021-03-15 PROBLEM — Z80.0 FH: STOMACH CANCER: Status: ACTIVE | Noted: 2021-03-15

## 2021-03-15 PROBLEM — K31.7 GASTRIC POLYPS: Status: ACTIVE | Noted: 2021-03-15

## 2021-03-15 PROBLEM — D13.30 BENIGN NEOPLASM OF DUODENUM, JEJUNUM, AND ILEUM: Status: ACTIVE | Noted: 2021-03-15

## 2021-03-15 PROBLEM — K21.00 GASTRO-ESOPHAGEAL REFLUX DISEASE WITH ESOPHAGITIS: Status: ACTIVE | Noted: 2021-03-15

## 2021-03-30 ENCOUNTER — TELEPHONE (OUTPATIENT)
Dept: FAMILY MEDICINE CLINIC | Facility: CLINIC | Age: 56
End: 2021-03-30

## 2021-04-12 ENCOUNTER — IMMUNIZATION (OUTPATIENT)
Dept: LAB | Age: 56
End: 2021-04-12
Attending: HOSPITALIST
Payer: COMMERCIAL

## 2021-04-12 DIAGNOSIS — Z23 NEED FOR VACCINATION: Primary | ICD-10-CM

## 2021-04-12 PROCEDURE — 0001A SARSCOV2 VAC 30MCG/0.3ML IM: CPT

## 2021-04-16 DIAGNOSIS — E11.9 CONTROLLED TYPE 2 DIABETES MELLITUS WITHOUT COMPLICATION, WITHOUT LONG-TERM CURRENT USE OF INSULIN (HCC): Primary | ICD-10-CM

## 2021-04-16 DIAGNOSIS — I10 ESSENTIAL HYPERTENSION: ICD-10-CM

## 2021-04-16 DIAGNOSIS — E78.2 MIXED HYPERLIPIDEMIA: ICD-10-CM

## 2021-04-16 RX ORDER — LOSARTAN POTASSIUM 50 MG/1
TABLET ORAL
Qty: 30 TABLET | Refills: 0 | Status: SHIPPED | OUTPATIENT
Start: 2021-04-16 | End: 2021-05-28

## 2021-04-20 ENCOUNTER — TELEPHONE (OUTPATIENT)
Dept: FAMILY MEDICINE CLINIC | Facility: CLINIC | Age: 56
End: 2021-04-20

## 2021-04-20 DIAGNOSIS — I10 ESSENTIAL HYPERTENSION: ICD-10-CM

## 2021-04-20 DIAGNOSIS — E78.2 MIXED HYPERLIPIDEMIA: ICD-10-CM

## 2021-04-20 DIAGNOSIS — E11.9 CONTROLLED TYPE 2 DIABETES MELLITUS WITHOUT COMPLICATION, WITHOUT LONG-TERM CURRENT USE OF INSULIN (HCC): Primary | ICD-10-CM

## 2021-04-20 NOTE — TELEPHONE ENCOUNTER
Pt scheduled PE for 5/4. He will get labs done prior, however orders are entered for [facility]. Please change lab to THE [facility]. Changed in Reg. Please advise when orders are changed. Patient will call and schedule tomorrow to go to THE [facility] lab.

## 2021-04-21 NOTE — TELEPHONE ENCOUNTER
ML advising patient lab orders have been changed to [facility] as requested. Instructed to call Central Scheduling for appt. Instructed to be fasting except water for blood draw.

## 2021-04-29 ENCOUNTER — LAB ENCOUNTER (OUTPATIENT)
Dept: LAB | Age: 56
End: 2021-04-29
Attending: FAMILY MEDICINE
Payer: COMMERCIAL

## 2021-04-29 DIAGNOSIS — E11.9 CONTROLLED TYPE 2 DIABETES MELLITUS WITHOUT COMPLICATION, WITHOUT LONG-TERM CURRENT USE OF INSULIN (HCC): ICD-10-CM

## 2021-04-29 DIAGNOSIS — I10 ESSENTIAL HYPERTENSION: ICD-10-CM

## 2021-04-29 DIAGNOSIS — E78.2 MIXED HYPERLIPIDEMIA: ICD-10-CM

## 2021-04-29 PROCEDURE — 3051F HG A1C>EQUAL 7.0%<8.0%: CPT | Performed by: FAMILY MEDICINE

## 2021-04-29 PROCEDURE — 36415 COLL VENOUS BLD VENIPUNCTURE: CPT

## 2021-04-29 PROCEDURE — 82570 ASSAY OF URINE CREATININE: CPT

## 2021-04-29 PROCEDURE — 82043 UR ALBUMIN QUANTITATIVE: CPT

## 2021-04-29 PROCEDURE — 80053 COMPREHEN METABOLIC PANEL: CPT

## 2021-04-29 PROCEDURE — 80061 LIPID PANEL: CPT

## 2021-04-29 PROCEDURE — 83036 HEMOGLOBIN GLYCOSYLATED A1C: CPT

## 2021-05-03 ENCOUNTER — IMMUNIZATION (OUTPATIENT)
Dept: LAB | Age: 56
End: 2021-05-03
Attending: HOSPITALIST
Payer: COMMERCIAL

## 2021-05-03 DIAGNOSIS — Z23 NEED FOR VACCINATION: Primary | ICD-10-CM

## 2021-05-03 PROCEDURE — 0002A SARSCOV2 VAC 30MCG/0.3ML IM: CPT

## 2021-05-10 ENCOUNTER — OFFICE VISIT (OUTPATIENT)
Dept: FAMILY MEDICINE CLINIC | Facility: CLINIC | Age: 56
End: 2021-05-10
Payer: COMMERCIAL

## 2021-05-10 VITALS
OXYGEN SATURATION: 97 % | TEMPERATURE: 97 F | SYSTOLIC BLOOD PRESSURE: 136 MMHG | BODY MASS INDEX: 34.07 KG/M2 | HEIGHT: 69 IN | RESPIRATION RATE: 18 BRPM | DIASTOLIC BLOOD PRESSURE: 78 MMHG | WEIGHT: 230 LBS | HEART RATE: 71 BPM

## 2021-05-10 DIAGNOSIS — Z00.00 ROUTINE GENERAL MEDICAL EXAMINATION AT A HEALTH CARE FACILITY: Primary | ICD-10-CM

## 2021-05-10 DIAGNOSIS — E11.9 TYPE 2 DIABETES MELLITUS WITHOUT COMPLICATION, WITHOUT LONG-TERM CURRENT USE OF INSULIN (HCC): ICD-10-CM

## 2021-05-10 DIAGNOSIS — I10 ESSENTIAL HYPERTENSION: ICD-10-CM

## 2021-05-10 PROCEDURE — 3075F SYST BP GE 130 - 139MM HG: CPT | Performed by: FAMILY MEDICINE

## 2021-05-10 PROCEDURE — 99396 PREV VISIT EST AGE 40-64: CPT | Performed by: FAMILY MEDICINE

## 2021-05-10 PROCEDURE — 99213 OFFICE O/P EST LOW 20 MIN: CPT | Performed by: FAMILY MEDICINE

## 2021-05-10 PROCEDURE — 3008F BODY MASS INDEX DOCD: CPT | Performed by: FAMILY MEDICINE

## 2021-05-10 PROCEDURE — 3078F DIAST BP <80 MM HG: CPT | Performed by: FAMILY MEDICINE

## 2021-05-10 RX ORDER — GLIMEPIRIDE 2 MG/1
4 TABLET ORAL 2 TIMES DAILY
Qty: 270 TABLET | Refills: 1 | COMMUNITY
Start: 2021-05-10 | End: 2021-11-30 | Stop reason: DRUGHIGH

## 2021-05-10 RX ORDER — GLIMEPIRIDE 2 MG/1
4 TABLET ORAL 2 TIMES DAILY
Qty: 270 TABLET | Refills: 1 | COMMUNITY
Start: 2021-05-10 | End: 2021-05-10

## 2021-05-10 NOTE — PROGRESS NOTES
/78   Pulse 71   Temp 97.2 °F (36.2 °C) (Temporal)   Resp 18   Ht 5' 9\" (1.753 m)   Wt 230 lb (104.3 kg)   SpO2 97%   BMI 33.97 kg/m²  Body mass index is 33.97 kg/m².      Patient presents with:  Physical      Clary Lo is a 50year old male wheezing). 1 each 1   • Multiple Vitamins-Minerals (MULTIVITAMIN OR) Take 1 tablet by mouth daily.         Past Medical History:   Diagnosis Date   • Arthritis    • Flatulence/gas pain/belching    • Heartburn 2/1/20   • Osteoarthritis     left shoulder   • Exposure: No        Hobby Hazards: No        Sleep Concern: Yes          up at least once/night, sometimes x 2, hard to get back asleep        Stress Concern: No        Weight Concern: Yes          lost 30, goal is 200        Special Diet: Yes          Johnson back  EXTREMITIES: no cyanosis, clubbing or edema  NEURO: Oriented times three,cranial nerves are intact,motor and sensory are grossly intact    ASSESSMENT AND PLAN:   :Eulogio Doe was seen today for physical.    Diagnoses and all orders for this visit:    Gus Stafford overall health. Recommend at least 30 minutes a day 3-4 times a week of aerobic activity such as brisk walking, cycling, aerobics, or swimming.   Anerobic activities also encouraged for overall toning and strength/endurance building  Pt info given for: ex

## 2021-05-28 RX ORDER — LOSARTAN POTASSIUM 50 MG/1
TABLET ORAL
Qty: 90 TABLET | Refills: 1 | Status: SHIPPED | OUTPATIENT
Start: 2021-05-28 | End: 2021-11-30

## 2021-05-28 NOTE — TELEPHONE ENCOUNTER
LOV 5/10/2021    LAST LAB 4/29/2021    LAST RX   LOSARTAN POTASSIUM 50 MG Oral Tab 30 tablet 0 4/16/2021    Sig:   Take 1 tablet by mouth once daily     Route:   (none)           Next OV  No future appointments.     PROTOCOLHypertension Medications Protocol

## 2021-10-06 NOTE — TELEPHONE ENCOUNTER
LOV 5/10/21    LAST LAB   Ref Range & Units 4/29/21 11:41 AM   HgbA1C   <5.7 % 7.9 High           LAST RX   metFORMIN HCl 1000 MG Oral Tab 180 tablet 1 6/8/2020 5/10/2021   Sig:   Take 1 tablet (1,000 mg total) by mouth 2 (two) times daily with meals.

## 2021-11-16 ENCOUNTER — TELEPHONE (OUTPATIENT)
Dept: FAMILY MEDICINE CLINIC | Facility: CLINIC | Age: 56
End: 2021-11-16

## 2021-11-16 DIAGNOSIS — E78.00 HYPERCHOLESTEROLEMIA: ICD-10-CM

## 2021-11-16 DIAGNOSIS — E11.649 CONTROLLED TYPE 2 DIABETES MELLITUS WITH HYPOGLYCEMIA, WITHOUT LONG-TERM CURRENT USE OF INSULIN (HCC): Primary | ICD-10-CM

## 2021-11-16 NOTE — TELEPHONE ENCOUNTER
Pt scheduled DM f/u, does he need to get any labs done?  Please advose    Future Appointments   Date Time Provider Florinda Karen   12/6/2021 11:40 AM Dana Ball MD [facility]

## 2021-11-16 NOTE — TELEPHONE ENCOUNTER
Dr. Netta Shah,  Please advise if labs due    LOV 5/10/21  PE  +2    Last Labs 4/29/21    A1c is uncontrolled again 7.9.  This is not quite as badly out of range as you have been in the past, but we do need to discuss this.  I will see you Thursday for your

## 2021-11-16 NOTE — TELEPHONE ENCOUNTER
Lm for pt (Garrett Borrego per HIPAA) to inform per PCP indicated ordered fasting labs to be completed prior to scheduled DM f/u visit on 12/6. To call back at the office if any further questions.

## 2021-11-26 ENCOUNTER — LAB ENCOUNTER (OUTPATIENT)
Dept: LAB | Age: 56
End: 2021-11-26
Attending: FAMILY MEDICINE
Payer: COMMERCIAL

## 2021-11-26 DIAGNOSIS — E78.00 HYPERCHOLESTEROLEMIA: ICD-10-CM

## 2021-11-26 DIAGNOSIS — E11.649 CONTROLLED TYPE 2 DIABETES MELLITUS WITH HYPOGLYCEMIA, WITHOUT LONG-TERM CURRENT USE OF INSULIN (HCC): ICD-10-CM

## 2021-11-26 PROCEDURE — 82570 ASSAY OF URINE CREATININE: CPT

## 2021-11-26 PROCEDURE — 3061F NEG MICROALBUMINURIA REV: CPT | Performed by: FAMILY MEDICINE

## 2021-11-26 PROCEDURE — 36415 COLL VENOUS BLD VENIPUNCTURE: CPT

## 2021-11-26 PROCEDURE — 83036 HEMOGLOBIN GLYCOSYLATED A1C: CPT

## 2021-11-26 PROCEDURE — 80053 COMPREHEN METABOLIC PANEL: CPT

## 2021-11-26 PROCEDURE — 82043 UR ALBUMIN QUANTITATIVE: CPT

## 2021-11-26 PROCEDURE — 3046F HEMOGLOBIN A1C LEVEL >9.0%: CPT | Performed by: FAMILY MEDICINE

## 2021-11-26 PROCEDURE — 80061 LIPID PANEL: CPT

## 2021-11-30 ENCOUNTER — OFFICE VISIT (OUTPATIENT)
Dept: FAMILY MEDICINE CLINIC | Facility: CLINIC | Age: 56
End: 2021-11-30
Payer: COMMERCIAL

## 2021-11-30 ENCOUNTER — MED REC SCAN ONLY (OUTPATIENT)
Dept: FAMILY MEDICINE CLINIC | Facility: CLINIC | Age: 56
End: 2021-11-30

## 2021-11-30 VITALS
SYSTOLIC BLOOD PRESSURE: 138 MMHG | BODY MASS INDEX: 33.18 KG/M2 | TEMPERATURE: 97 F | HEIGHT: 69 IN | OXYGEN SATURATION: 98 % | HEART RATE: 73 BPM | RESPIRATION RATE: 18 BRPM | WEIGHT: 224 LBS | DIASTOLIC BLOOD PRESSURE: 88 MMHG

## 2021-11-30 DIAGNOSIS — E66.9 OBESITY (BMI 30.0-34.9): ICD-10-CM

## 2021-11-30 DIAGNOSIS — Z23 NEED FOR VACCINATION: ICD-10-CM

## 2021-11-30 DIAGNOSIS — E11.65 UNCONTROLLED TYPE 2 DIABETES MELLITUS WITH HYPERGLYCEMIA (HCC): Primary | ICD-10-CM

## 2021-11-30 DIAGNOSIS — I10 ESSENTIAL HYPERTENSION: ICD-10-CM

## 2021-11-30 DIAGNOSIS — K21.00 GASTROESOPHAGEAL REFLUX DISEASE WITH ESOPHAGITIS, UNSPECIFIED WHETHER HEMORRHAGE: ICD-10-CM

## 2021-11-30 DIAGNOSIS — E78.00 HYPERCHOLESTEROLEMIA: ICD-10-CM

## 2021-11-30 PROBLEM — E66.811 OBESITY (BMI 30.0-34.9): Status: ACTIVE | Noted: 2021-11-30

## 2021-11-30 PROCEDURE — 90686 IIV4 VACC NO PRSV 0.5 ML IM: CPT | Performed by: FAMILY MEDICINE

## 2021-11-30 PROCEDURE — 3075F SYST BP GE 130 - 139MM HG: CPT | Performed by: FAMILY MEDICINE

## 2021-11-30 PROCEDURE — 99214 OFFICE O/P EST MOD 30 MIN: CPT | Performed by: FAMILY MEDICINE

## 2021-11-30 PROCEDURE — 3008F BODY MASS INDEX DOCD: CPT | Performed by: FAMILY MEDICINE

## 2021-11-30 PROCEDURE — 90471 IMMUNIZATION ADMIN: CPT | Performed by: FAMILY MEDICINE

## 2021-11-30 PROCEDURE — 3079F DIAST BP 80-89 MM HG: CPT | Performed by: FAMILY MEDICINE

## 2021-11-30 RX ORDER — LOSARTAN POTASSIUM 50 MG/1
50 TABLET ORAL DAILY
Qty: 90 TABLET | Refills: 0 | Status: SHIPPED | OUTPATIENT
Start: 2021-11-30

## 2021-11-30 RX ORDER — METFORMIN HYDROCHLORIDE 500 MG/1
1000 TABLET, EXTENDED RELEASE ORAL 2 TIMES DAILY WITH MEALS
Qty: 360 TABLET | Refills: 0 | Status: SHIPPED | OUTPATIENT
Start: 2021-11-30 | End: 2022-02-28

## 2021-11-30 RX ORDER — PIOGLITAZONEHYDROCHLORIDE 15 MG/1
15 TABLET ORAL DAILY
Qty: 90 TABLET | Refills: 0 | Status: SHIPPED | OUTPATIENT
Start: 2021-11-30 | End: 2022-02-28

## 2021-11-30 RX ORDER — GLIMEPIRIDE 2 MG/1
4 TABLET ORAL 2 TIMES DAILY
Qty: 270 TABLET | Refills: 1 | Status: CANCELLED | OUTPATIENT
Start: 2021-11-30

## 2021-11-30 RX ORDER — GLIMEPIRIDE 4 MG/1
4 TABLET ORAL
Qty: 90 TABLET | Refills: 0 | Status: SHIPPED | OUTPATIENT
Start: 2021-11-30 | End: 2022-02-28

## 2021-11-30 RX ORDER — PANTOPRAZOLE SODIUM 40 MG/1
TABLET, DELAYED RELEASE ORAL
Qty: 90 TABLET | Refills: 0 | Status: SHIPPED | OUTPATIENT
Start: 2021-11-30

## 2021-11-30 RX ORDER — ATORVASTATIN CALCIUM 40 MG/1
40 TABLET, FILM COATED ORAL NIGHTLY
Qty: 90 TABLET | Refills: 0 | Status: SHIPPED | OUTPATIENT
Start: 2021-11-30 | End: 2022-02-28

## 2021-11-30 NOTE — PROGRESS NOTES
Deanna Branham is a 49year old male.   Patient presents with:  Diabetes    HPI:   Deanna Branham is a 49year old male with history of diabetes, hypertension, hyperlipidemia and GERD seen for initial visit to establish care after his PCP Dr.Pector rodriguez tablet by mouth daily.         Past Medical History:   Diagnosis Date   • Arthritis    • Flatulence/gas pain/belching    • Heartburn 2/1/20   • Osteoarthritis     left shoulder   • Type II or unspecified type diabetes mellitus without mention of complicatio Total      21.0 - 32.0 mmol/L   26.0   ANION GAP      0 - 18 mmol/L   7   BUN      7 - 18 mg/dL   13   CREATININE      0.70 - 1.30 mg/dL   0.93   CALCIUM      8.5 - 10.1 mg/dL   8.6   CALCULATED OSMOLALITY      275 - 295 mOsm/kg   293   eGFR NON-AFR.  AMERI day.  Reviewed patient medications and previously patient was on Metformin extended release and the most recent prescription was Metformin immediate release.   Discussed with patient since he tolerated extended release in the past will prescribe it again an

## 2021-11-30 NOTE — PATIENT INSTRUCTIONS
Long-Term Complications of Diabetes  Diabetes can cause health problems over time. These are called complications. They are more likely to happen if your blood sugar is often too high. Over time, high blood sugar can damage blood vessels in your body.  It medical care. Always follow your healthcare professional's instructions. Healthy Meals for Diabetes     A healthcare provider will help you develop a meal plan that fits your needs.    Ask your healthcare team to help you make a meal plan that fits y foods  Eating protein that is low in fat can help you control your weight. It also helps keep your heart healthy.  Low-fat protein foods include:  · Fish  · Plant proteins, such as dry beans and peas, nuts, and soy products like tofu and soymilk  · Lean isadora Here are a few snack ideas to choose from:  Snacks with less than 5 grams of carbohydrates  · 1 piece of string cheese  · 3 celery sticks plus 1 tablespoon of peanut butter  · 5 cherry tomatoes plus 1 tablespoon of ranch dressing  · 1 hard-boiled egg  · 1/

## 2021-12-15 ENCOUNTER — DIABETIC EDUCATION (OUTPATIENT)
Dept: ENDOCRINOLOGY CLINIC | Facility: CLINIC | Age: 56
End: 2021-12-15
Payer: COMMERCIAL

## 2021-12-15 DIAGNOSIS — E11.65 TYPE 2 DIABETES MELLITUS WITH HYPERGLYCEMIA, WITHOUT LONG-TERM CURRENT USE OF INSULIN (HCC): Primary | ICD-10-CM

## 2021-12-15 PROCEDURE — 97802 MEDICAL NUTRITION INDIV IN: CPT | Performed by: DIETITIAN, REGISTERED

## 2021-12-15 NOTE — PROGRESS NOTES
James Alexander   6/24/1965 was seen for Medical Nutrition Therapy with Diabetes:    12/15/2021  Referring Provider: Dr. Calista King  Start time: 5:00 End time: 6:00    HGBA1C (%)   Date Value   07/01/2014 5.9 (H)     HgbA1C (%)   Date Value   11/26/2021 9.1 fiber.    Exercise: Discussed benefits of regular physical activity and goal to complete 30 minutes daily.     Patient set diabetes self-management goals: 1) watch saturated fat/reduce egg yolks  2) increase cardio/walking  3) start using [employer] to tr

## 2022-02-24 ENCOUNTER — LAB ENCOUNTER (OUTPATIENT)
Dept: LAB | Age: 57
End: 2022-02-24
Attending: FAMILY MEDICINE
Payer: COMMERCIAL

## 2022-02-24 DIAGNOSIS — E78.00 HYPERCHOLESTEROLEMIA: ICD-10-CM

## 2022-02-24 DIAGNOSIS — E11.65 UNCONTROLLED TYPE 2 DIABETES MELLITUS WITH HYPERGLYCEMIA (HCC): ICD-10-CM

## 2022-02-24 LAB
ALBUMIN SERPL-MCNC: 3.7 G/DL (ref 3.4–5)
ALBUMIN/GLOB SERPL: 1.2 {RATIO} (ref 1–2)
ALP LIVER SERPL-CCNC: 75 U/L
ALT SERPL-CCNC: 22 U/L
ANION GAP SERPL CALC-SCNC: 6 MMOL/L (ref 0–18)
AST SERPL-CCNC: 14 U/L (ref 15–37)
BILIRUB SERPL-MCNC: 0.4 MG/DL (ref 0.1–2)
BUN BLD-MCNC: 16 MG/DL (ref 7–18)
CALCIUM BLD-MCNC: 9 MG/DL (ref 8.5–10.1)
CHLORIDE SERPL-SCNC: 106 MMOL/L (ref 98–112)
CHOLEST SERPL-MCNC: 188 MG/DL (ref ?–200)
CO2 SERPL-SCNC: 27 MMOL/L (ref 21–32)
CREAT BLD-MCNC: 0.94 MG/DL
CREAT UR-SCNC: 198 MG/DL
EST. AVERAGE GLUCOSE BLD GHB EST-MCNC: 140 MG/DL (ref 68–126)
FASTING PATIENT LIPID ANSWER: YES
FASTING STATUS PATIENT QL REPORTED: YES
GLOBULIN PLAS-MCNC: 3.1 G/DL (ref 2.8–4.4)
GLUCOSE BLD-MCNC: 109 MG/DL (ref 70–99)
HBA1C MFR BLD: 6.5 % (ref ?–5.7)
HDLC SERPL-MCNC: 44 MG/DL (ref 40–59)
LDLC SERPL CALC-MCNC: 122 MG/DL (ref ?–100)
MICROALBUMIN UR-MCNC: 1.19 MG/DL
MICROALBUMIN/CREAT 24H UR-RTO: 6 UG/MG (ref ?–30)
NONHDLC SERPL-MCNC: 144 MG/DL (ref ?–130)
OSMOLALITY SERPL CALC.SUM OF ELEC: 290 MOSM/KG (ref 275–295)
PROT SERPL-MCNC: 6.8 G/DL (ref 6.4–8.2)
SODIUM SERPL-SCNC: 139 MMOL/L (ref 136–145)
TRIGL SERPL-MCNC: 122 MG/DL (ref 30–149)
VLDLC SERPL CALC-MCNC: 22 MG/DL (ref 0–30)

## 2022-02-24 PROCEDURE — 3044F HG A1C LEVEL LT 7.0%: CPT | Performed by: FAMILY MEDICINE

## 2022-02-24 PROCEDURE — 83036 HEMOGLOBIN GLYCOSYLATED A1C: CPT

## 2022-02-24 PROCEDURE — 80061 LIPID PANEL: CPT

## 2022-02-24 PROCEDURE — 82570 ASSAY OF URINE CREATININE: CPT

## 2022-02-24 PROCEDURE — 3061F NEG MICROALBUMINURIA REV: CPT | Performed by: FAMILY MEDICINE

## 2022-02-24 PROCEDURE — 36415 COLL VENOUS BLD VENIPUNCTURE: CPT

## 2022-02-24 PROCEDURE — 82043 UR ALBUMIN QUANTITATIVE: CPT

## 2022-02-24 PROCEDURE — 80053 COMPREHEN METABOLIC PANEL: CPT

## 2022-02-28 ENCOUNTER — OFFICE VISIT (OUTPATIENT)
Dept: FAMILY MEDICINE CLINIC | Facility: CLINIC | Age: 57
End: 2022-02-28
Payer: COMMERCIAL

## 2022-02-28 VITALS
HEIGHT: 69 IN | SYSTOLIC BLOOD PRESSURE: 138 MMHG | DIASTOLIC BLOOD PRESSURE: 88 MMHG | HEART RATE: 76 BPM | OXYGEN SATURATION: 98 % | WEIGHT: 229 LBS | TEMPERATURE: 98 F | BODY MASS INDEX: 33.92 KG/M2 | RESPIRATION RATE: 18 BRPM

## 2022-02-28 DIAGNOSIS — K21.00 GASTROESOPHAGEAL REFLUX DISEASE WITH ESOPHAGITIS, UNSPECIFIED WHETHER HEMORRHAGE: ICD-10-CM

## 2022-02-28 DIAGNOSIS — E11.9 CONTROLLED TYPE 2 DIABETES MELLITUS WITHOUT COMPLICATION, WITHOUT LONG-TERM CURRENT USE OF INSULIN (HCC): Primary | ICD-10-CM

## 2022-02-28 DIAGNOSIS — E78.00 HYPERCHOLESTEROLEMIA: ICD-10-CM

## 2022-02-28 DIAGNOSIS — I10 ESSENTIAL HYPERTENSION: ICD-10-CM

## 2022-02-28 PROCEDURE — 3008F BODY MASS INDEX DOCD: CPT | Performed by: FAMILY MEDICINE

## 2022-02-28 PROCEDURE — 3079F DIAST BP 80-89 MM HG: CPT | Performed by: FAMILY MEDICINE

## 2022-02-28 PROCEDURE — 99214 OFFICE O/P EST MOD 30 MIN: CPT | Performed by: FAMILY MEDICINE

## 2022-02-28 PROCEDURE — 3075F SYST BP GE 130 - 139MM HG: CPT | Performed by: FAMILY MEDICINE

## 2022-02-28 RX ORDER — LOSARTAN POTASSIUM 50 MG/1
50 TABLET ORAL DAILY
Qty: 90 TABLET | Refills: 1 | Status: SHIPPED | OUTPATIENT
Start: 2022-02-28

## 2022-02-28 RX ORDER — ATORVASTATIN CALCIUM 40 MG/1
40 TABLET, FILM COATED ORAL NIGHTLY
Qty: 90 TABLET | Refills: 1 | Status: SHIPPED | OUTPATIENT
Start: 2022-02-28 | End: 2022-05-29

## 2022-02-28 RX ORDER — PANTOPRAZOLE SODIUM 40 MG/1
TABLET, DELAYED RELEASE ORAL
Qty: 90 TABLET | Refills: 1 | Status: SHIPPED | OUTPATIENT
Start: 2022-02-28

## 2022-02-28 RX ORDER — PIOGLITAZONEHYDROCHLORIDE 15 MG/1
15 TABLET ORAL DAILY
Qty: 90 TABLET | Refills: 1 | Status: SHIPPED | OUTPATIENT
Start: 2022-02-28 | End: 2022-05-29

## 2022-02-28 RX ORDER — METFORMIN HYDROCHLORIDE 500 MG/1
1000 TABLET, EXTENDED RELEASE ORAL 2 TIMES DAILY WITH MEALS
Qty: 360 TABLET | Refills: 1 | Status: SHIPPED | OUTPATIENT
Start: 2022-02-28 | End: 2022-05-29

## 2022-04-08 RX ORDER — GLIMEPIRIDE 4 MG/1
TABLET ORAL
Qty: 90 TABLET | Refills: 0 | Status: SHIPPED | OUTPATIENT
Start: 2022-04-08

## 2022-05-20 ENCOUNTER — TELEPHONE (OUTPATIENT)
Dept: FAMILY MEDICINE CLINIC | Facility: CLINIC | Age: 57
End: 2022-05-20

## 2022-05-20 NOTE — TELEPHONE ENCOUNTER
Patient stated that even after using a coupon for Jardiance, his cost is over $300. He has not picked up medication. Patient is asking if a prior authorization can be done or another coupon can be used. If not, he is asking for an alternative medication.

## 2022-05-24 NOTE — TELEPHONE ENCOUNTER
LOV 2/2/22  No PA required. Pt cost after insurance processed. Please advise on alternative. Thank you.

## 2022-05-25 NOTE — TELEPHONE ENCOUNTER
Per Kennedy, 30 day supply of Jardiance over $500.00. Also checked with DM clinic, will provide pt with Jardiance coupon/savings card    [phone]  Lm for pt ([id number] Anay Sanchez per HIPAA) to inform confirmed with pharmacy no PA required. Checked GoodRx, still pricey. Best option is Jardiance savings card, pt will need to activate online or over the phone. Left savings card at our office  for . To call back at the office if any further questions.

## 2022-05-26 NOTE — TELEPHONE ENCOUNTER
Pt called back stating he already did the discount card and they won't let him do it for a second time. Just wanted to update us, so we can figure out where to go from here. Pt is going to recheck with [facility]. Advised if it is an insurance thing, his best bet would be to find out from insurance what is covered. Pt agreed and will check.  Will wait to hear back from MD.

## 2022-08-14 DIAGNOSIS — E11.9 CONTROLLED TYPE 2 DIABETES MELLITUS WITHOUT COMPLICATION, WITHOUT LONG-TERM CURRENT USE OF INSULIN (HCC): ICD-10-CM

## 2022-08-15 RX ORDER — EMPAGLIFLOZIN 10 MG/1
TABLET, FILM COATED ORAL
Qty: 90 TABLET | Refills: 0 | OUTPATIENT
Start: 2022-08-15

## 2022-09-28 ENCOUNTER — LAB ENCOUNTER (OUTPATIENT)
Dept: LAB | Age: 57
End: 2022-09-28
Attending: FAMILY MEDICINE

## 2022-09-28 DIAGNOSIS — E78.00 HYPERCHOLESTEROLEMIA: ICD-10-CM

## 2022-09-28 DIAGNOSIS — E11.9 CONTROLLED TYPE 2 DIABETES MELLITUS WITHOUT COMPLICATION, WITHOUT LONG-TERM CURRENT USE OF INSULIN (HCC): ICD-10-CM

## 2022-09-28 LAB
ALBUMIN SERPL-MCNC: 3.5 G/DL (ref 3.4–5)
ALBUMIN/GLOB SERPL: 1 {RATIO} (ref 1–2)
ALP LIVER SERPL-CCNC: 81 U/L
ALT SERPL-CCNC: 21 U/L
ANION GAP SERPL CALC-SCNC: 6 MMOL/L (ref 0–18)
AST SERPL-CCNC: 16 U/L (ref 15–37)
BILIRUB SERPL-MCNC: 0.7 MG/DL (ref 0.1–2)
BUN BLD-MCNC: 16 MG/DL (ref 7–18)
CALCIUM BLD-MCNC: 8.7 MG/DL (ref 8.5–10.1)
CHLORIDE SERPL-SCNC: 107 MMOL/L (ref 98–112)
CHOLEST SERPL-MCNC: 115 MG/DL (ref ?–200)
CO2 SERPL-SCNC: 27 MMOL/L (ref 21–32)
CREAT BLD-MCNC: 0.99 MG/DL
EST. AVERAGE GLUCOSE BLD GHB EST-MCNC: 154 MG/DL (ref 68–126)
FASTING PATIENT LIPID ANSWER: YES
FASTING STATUS PATIENT QL REPORTED: YES
GFR SERPLBLD BASED ON 1.73 SQ M-ARVRAT: 90 ML/MIN/1.73M2 (ref 60–?)
GLOBULIN PLAS-MCNC: 3.4 G/DL (ref 2.8–4.4)
GLUCOSE BLD-MCNC: 166 MG/DL (ref 70–99)
HBA1C MFR BLD: 7 % (ref ?–5.7)
HDLC SERPL-MCNC: 50 MG/DL (ref 40–59)
LDLC SERPL CALC-MCNC: 51 MG/DL (ref ?–100)
NONHDLC SERPL-MCNC: 65 MG/DL (ref ?–130)
OSMOLALITY SERPL CALC.SUM OF ELEC: 295 MOSM/KG (ref 275–295)
POTASSIUM SERPL-SCNC: 4 MMOL/L (ref 3.5–5.1)
PROT SERPL-MCNC: 6.9 G/DL (ref 6.4–8.2)
SODIUM SERPL-SCNC: 140 MMOL/L (ref 136–145)
TRIGL SERPL-MCNC: 66 MG/DL (ref 30–149)
VLDLC SERPL CALC-MCNC: 9 MG/DL (ref 0–30)

## 2022-09-28 PROCEDURE — 36415 COLL VENOUS BLD VENIPUNCTURE: CPT

## 2022-09-28 PROCEDURE — 83036 HEMOGLOBIN GLYCOSYLATED A1C: CPT

## 2022-09-28 PROCEDURE — 80061 LIPID PANEL: CPT

## 2022-09-28 PROCEDURE — 3051F HG A1C>EQUAL 7.0%<8.0%: CPT | Performed by: FAMILY MEDICINE

## 2022-09-28 PROCEDURE — 80053 COMPREHEN METABOLIC PANEL: CPT

## 2022-11-02 ENCOUNTER — OFFICE VISIT (OUTPATIENT)
Dept: FAMILY MEDICINE CLINIC | Facility: CLINIC | Age: 57
End: 2022-11-02
Payer: COMMERCIAL

## 2022-11-02 VITALS
BODY MASS INDEX: 34.36 KG/M2 | TEMPERATURE: 98 F | OXYGEN SATURATION: 98 % | DIASTOLIC BLOOD PRESSURE: 78 MMHG | HEART RATE: 79 BPM | RESPIRATION RATE: 18 BRPM | HEIGHT: 69 IN | WEIGHT: 232 LBS | SYSTOLIC BLOOD PRESSURE: 122 MMHG

## 2022-11-02 DIAGNOSIS — Z12.5 SCREENING FOR PROSTATE CANCER: ICD-10-CM

## 2022-11-02 DIAGNOSIS — E78.00 HYPERCHOLESTEROLEMIA: ICD-10-CM

## 2022-11-02 DIAGNOSIS — Z23 NEED FOR VACCINATION: ICD-10-CM

## 2022-11-02 DIAGNOSIS — K21.00 GASTROESOPHAGEAL REFLUX DISEASE WITH ESOPHAGITIS, UNSPECIFIED WHETHER HEMORRHAGE: ICD-10-CM

## 2022-11-02 DIAGNOSIS — I10 ESSENTIAL HYPERTENSION: ICD-10-CM

## 2022-11-02 DIAGNOSIS — Z00.00 LABORATORY EXAM ORDERED AS PART OF ROUTINE GENERAL MEDICAL EXAMINATION: ICD-10-CM

## 2022-11-02 DIAGNOSIS — E66.9 OBESITY (BMI 30.0-34.9): ICD-10-CM

## 2022-11-02 DIAGNOSIS — E11.9 CONTROLLED TYPE 2 DIABETES MELLITUS WITHOUT COMPLICATION, WITHOUT LONG-TERM CURRENT USE OF INSULIN (HCC): Primary | ICD-10-CM

## 2022-11-02 PROCEDURE — 90471 IMMUNIZATION ADMIN: CPT | Performed by: FAMILY MEDICINE

## 2022-11-02 PROCEDURE — 99214 OFFICE O/P EST MOD 30 MIN: CPT | Performed by: FAMILY MEDICINE

## 2022-11-02 PROCEDURE — 3078F DIAST BP <80 MM HG: CPT | Performed by: FAMILY MEDICINE

## 2022-11-02 PROCEDURE — 3008F BODY MASS INDEX DOCD: CPT | Performed by: FAMILY MEDICINE

## 2022-11-02 PROCEDURE — 90686 IIV4 VACC NO PRSV 0.5 ML IM: CPT | Performed by: FAMILY MEDICINE

## 2022-11-02 PROCEDURE — 3074F SYST BP LT 130 MM HG: CPT | Performed by: FAMILY MEDICINE

## 2022-11-02 RX ORDER — DULAGLUTIDE 0.75 MG/.5ML
0.75 INJECTION, SOLUTION SUBCUTANEOUS WEEKLY
Qty: 4 EACH | Refills: 0 | Status: SHIPPED | OUTPATIENT
Start: 2022-11-02 | End: 2022-12-02

## 2022-11-02 RX ORDER — PIOGLITAZONEHYDROCHLORIDE 15 MG/1
15 TABLET ORAL DAILY
Qty: 90 TABLET | Refills: 1 | Status: SHIPPED | OUTPATIENT
Start: 2022-11-02 | End: 2023-01-31

## 2022-11-02 RX ORDER — ATORVASTATIN CALCIUM 40 MG/1
40 TABLET, FILM COATED ORAL NIGHTLY
Qty: 90 TABLET | Refills: 1 | Status: SHIPPED | OUTPATIENT
Start: 2022-11-02 | End: 2023-01-31

## 2022-11-02 RX ORDER — METFORMIN HYDROCHLORIDE 500 MG/1
1000 TABLET, EXTENDED RELEASE ORAL 2 TIMES DAILY WITH MEALS
Qty: 360 TABLET | Refills: 1 | Status: SHIPPED | OUTPATIENT
Start: 2022-11-02 | End: 2023-01-31

## 2022-11-02 RX ORDER — DULAGLUTIDE 1.5 MG/.5ML
1.5 INJECTION, SOLUTION SUBCUTANEOUS WEEKLY
Qty: 4 EACH | Refills: 1 | Status: SHIPPED | OUTPATIENT
Start: 2022-11-02 | End: 2022-12-02

## 2022-11-02 RX ORDER — GLIMEPIRIDE 4 MG/1
4 TABLET ORAL
Qty: 90 TABLET | Refills: 0 | Status: CANCELLED | OUTPATIENT
Start: 2022-11-02

## 2022-11-02 RX ORDER — LOSARTAN POTASSIUM 50 MG/1
50 TABLET ORAL DAILY
Qty: 90 TABLET | Refills: 1 | Status: SHIPPED | OUTPATIENT
Start: 2022-11-02

## 2022-11-02 RX ORDER — PANTOPRAZOLE SODIUM 40 MG/1
TABLET, DELAYED RELEASE ORAL
Qty: 90 TABLET | Refills: 1 | Status: SHIPPED | OUTPATIENT
Start: 2022-11-02

## 2022-11-22 ENCOUNTER — TELEPHONE (OUTPATIENT)
Dept: FAMILY MEDICINE CLINIC | Facility: CLINIC | Age: 57
End: 2022-11-22

## 2022-11-25 ENCOUNTER — APPOINTMENT (OUTPATIENT)
Dept: GENERAL RADIOLOGY | Age: 57
End: 2022-11-25
Attending: EMERGENCY MEDICINE
Payer: COMMERCIAL

## 2022-11-25 ENCOUNTER — HOSPITAL ENCOUNTER (EMERGENCY)
Age: 57
Discharge: HOME | End: 2022-11-25
Attending: EMERGENCY MEDICINE
Payer: COMMERCIAL

## 2022-11-25 VITALS
DIASTOLIC BLOOD PRESSURE: 75 MMHG | HEART RATE: 70 BPM | WEIGHT: 227 LBS | HEIGHT: 70 IN | SYSTOLIC BLOOD PRESSURE: 144 MMHG | OXYGEN SATURATION: 98 % | TEMPERATURE: 99 F | RESPIRATION RATE: 16 BRPM | BODY MASS INDEX: 32.5 KG/M2

## 2022-11-25 DIAGNOSIS — S93.402A MODERATE LEFT ANKLE SPRAIN, INITIAL ENCOUNTER: Primary | ICD-10-CM

## 2022-11-25 PROCEDURE — 99283 EMERGENCY DEPT VISIT LOW MDM: CPT

## 2022-11-25 PROCEDURE — 73610 X-RAY EXAM OF ANKLE: CPT | Performed by: EMERGENCY MEDICINE

## 2022-11-25 PROCEDURE — 73630 X-RAY EXAM OF FOOT: CPT | Performed by: EMERGENCY MEDICINE

## 2022-11-25 NOTE — DISCHARGE INSTRUCTIONS
Stirrup splint and crutches with no weightbearing for few days then gradually advance as tolerated  Rest  Elevate your injured extremity  Apply cool compresses for 20 minutes at a time.   Tylenol for pain  Follow up with your doctor within a few days

## 2022-11-25 NOTE — ED INITIAL ASSESSMENT (HPI)
States playing football yesterday and came down on left ankle and heard \"pop\" pain in lateral foot, ankle and up leg.  No obvious swelling, no loc

## 2023-01-15 DIAGNOSIS — E11.9 CONTROLLED TYPE 2 DIABETES MELLITUS WITHOUT COMPLICATION, WITHOUT LONG-TERM CURRENT USE OF INSULIN (HCC): ICD-10-CM

## 2023-01-16 RX ORDER — DULAGLUTIDE 1.5 MG/.5ML
INJECTION, SOLUTION SUBCUTANEOUS
Qty: 2 ML | Refills: 0 | Status: SHIPPED | OUTPATIENT
Start: 2023-01-16

## 2023-01-16 NOTE — TELEPHONE ENCOUNTER
Diabetic Medication Protocol Passed 01/15/2023 01:49 PM   Protocol Details  HgBA1C procedure resulted in past 6 months    Last HgBA1C < 7.5    Microalbumin procedure in past 12 months or taking ACE/ARB    Appointment in past 6 or next 3 months     LOV 11/2/22     LAST LAB  9/28/22     LAST RX 11/2/22 4 each     Next OV   Future Appointments   Date Time Provider Florinda Jones   3/3/2023  9:00 AM Balbina Kolb MD [facility]         PROTOCOL pass

## 2023-02-09 ENCOUNTER — TELEPHONE (OUTPATIENT)
Dept: FAMILY MEDICINE CLINIC | Facility: CLINIC | Age: 58
End: 2023-02-09

## 2023-02-09 ENCOUNTER — PATIENT MESSAGE (OUTPATIENT)
Dept: FAMILY MEDICINE CLINIC | Facility: CLINIC | Age: 58
End: 2023-02-09

## 2023-02-09 DIAGNOSIS — E11.9 CONTROLLED TYPE 2 DIABETES MELLITUS WITHOUT COMPLICATION, WITHOUT LONG-TERM CURRENT USE OF INSULIN (HCC): Primary | ICD-10-CM

## 2023-02-09 NOTE — TELEPHONE ENCOUNTER
Dr. Chase Chery,  Please advise   Order pended if appropriate    Last Urine Micro Alb  2/24/22   Normal

## 2023-02-09 NOTE — TELEPHONE ENCOUNTER
Pt called stating he has a physical & diabetes f/u vinay on 3-3-23. Will get the blood work done prior. (orders entered)     Wants to know if Dr wants him to get the   MICROALB/CREAT RATIO, RANDOM URI checked this time. Please advise.   Let him know through My chart

## 2023-02-15 DIAGNOSIS — E11.9 CONTROLLED TYPE 2 DIABETES MELLITUS WITHOUT COMPLICATION, WITHOUT LONG-TERM CURRENT USE OF INSULIN (HCC): ICD-10-CM

## 2023-02-15 RX ORDER — DULAGLUTIDE 1.5 MG/.5ML
INJECTION, SOLUTION SUBCUTANEOUS
Qty: 2 ML | Refills: 0 | Status: SHIPPED | OUTPATIENT
Start: 2023-02-15

## 2023-02-15 NOTE — TELEPHONE ENCOUNTER
Diabetic Medication Protocol Passed 02/15/2023 07:03 AM   Protocol Details  HgBA1C procedure resulted in past 6 months    Last HgBA1C < 7.5    Microalbumin procedure in past 12 months or taking ACE/ARB    Appointment in past 6 or next 3 months        LOV 11/2/22 dr Viraj Jefferson     LAST LAB  9/28/22    LAST RX 1/16/23 2 ml     Next OV   Future Appointments   Date Time Provider Florinda Jones   2/24/2023  7:15 AM REF BBK REF [facility] Ref [facility] 8   3/3/2023  9:00 AM Ruth Lu MD [facility]         PROTOCOL pass

## 2023-02-24 ENCOUNTER — LAB ENCOUNTER (OUTPATIENT)
Dept: LAB | Age: 58
End: 2023-02-24
Attending: FAMILY MEDICINE
Payer: COMMERCIAL

## 2023-02-24 DIAGNOSIS — Z12.5 SCREENING FOR PROSTATE CANCER: ICD-10-CM

## 2023-02-24 DIAGNOSIS — Z00.00 LABORATORY EXAM ORDERED AS PART OF ROUTINE GENERAL MEDICAL EXAMINATION: ICD-10-CM

## 2023-02-24 DIAGNOSIS — E11.9 CONTROLLED TYPE 2 DIABETES MELLITUS WITHOUT COMPLICATION, WITHOUT LONG-TERM CURRENT USE OF INSULIN (HCC): ICD-10-CM

## 2023-02-24 DIAGNOSIS — E78.00 HYPERCHOLESTEROLEMIA: ICD-10-CM

## 2023-02-24 LAB
ALBUMIN SERPL-MCNC: 3.6 G/DL (ref 3.4–5)
ALBUMIN/GLOB SERPL: 1.1 {RATIO} (ref 1–2)
ALP LIVER SERPL-CCNC: 68 U/L
ALT SERPL-CCNC: 23 U/L
ANION GAP SERPL CALC-SCNC: 4 MMOL/L (ref 0–18)
AST SERPL-CCNC: 19 U/L (ref 15–37)
BASOPHILS # BLD AUTO: 0.05 X10(3) UL (ref 0–0.2)
BASOPHILS NFR BLD AUTO: 0.6 %
BILIRUB SERPL-MCNC: 0.7 MG/DL (ref 0.1–2)
BUN BLD-MCNC: 19 MG/DL (ref 7–18)
CALCIUM BLD-MCNC: 9 MG/DL (ref 8.5–10.1)
CHLORIDE SERPL-SCNC: 106 MMOL/L (ref 98–112)
CHOLEST SERPL-MCNC: 114 MG/DL (ref ?–200)
CO2 SERPL-SCNC: 29 MMOL/L (ref 21–32)
COMPLEXED PSA SERPL-MCNC: 0.72 NG/ML (ref ?–4)
CREAT BLD-MCNC: 1.04 MG/DL
CREAT UR-SCNC: 134 MG/DL
EOSINOPHIL # BLD AUTO: 0.14 X10(3) UL (ref 0–0.7)
EOSINOPHIL NFR BLD AUTO: 1.8 %
ERYTHROCYTE [DISTWIDTH] IN BLOOD BY AUTOMATED COUNT: 12.7 %
EST. AVERAGE GLUCOSE BLD GHB EST-MCNC: 131 MG/DL (ref 68–126)
FASTING PATIENT LIPID ANSWER: YES
FASTING STATUS PATIENT QL REPORTED: YES
GFR SERPLBLD BASED ON 1.73 SQ M-ARVRAT: 85 ML/MIN/1.73M2 (ref 60–?)
GLOBULIN PLAS-MCNC: 3.2 G/DL (ref 2.8–4.4)
GLUCOSE BLD-MCNC: 114 MG/DL (ref 70–99)
HBA1C MFR BLD: 6.2 % (ref ?–5.7)
HCT VFR BLD AUTO: 48.3 %
HDLC SERPL-MCNC: 45 MG/DL (ref 40–59)
HGB BLD-MCNC: 15.4 G/DL
IMM GRANULOCYTES # BLD AUTO: 0.05 X10(3) UL (ref 0–1)
IMM GRANULOCYTES NFR BLD: 0.6 %
LDLC SERPL CALC-MCNC: 56 MG/DL (ref ?–100)
LYMPHOCYTES # BLD AUTO: 2.45 X10(3) UL (ref 1–4)
LYMPHOCYTES NFR BLD AUTO: 30.9 %
MCH RBC QN AUTO: 30 PG (ref 26–34)
MCHC RBC AUTO-ENTMCNC: 31.9 G/DL (ref 31–37)
MCV RBC AUTO: 94 FL
MICROALBUMIN UR-MCNC: <0.5 MG/DL
MONOCYTES # BLD AUTO: 0.82 X10(3) UL (ref 0.1–1)
MONOCYTES NFR BLD AUTO: 10.3 %
NEUTROPHILS # BLD AUTO: 4.42 X10 (3) UL (ref 1.5–7.7)
NEUTROPHILS # BLD AUTO: 4.42 X10(3) UL (ref 1.5–7.7)
NEUTROPHILS NFR BLD AUTO: 55.8 %
NONHDLC SERPL-MCNC: 69 MG/DL (ref ?–130)
OSMOLALITY SERPL CALC.SUM OF ELEC: 291 MOSM/KG (ref 275–295)
PLATELET # BLD AUTO: 229 10(3)UL (ref 150–450)
POTASSIUM SERPL-SCNC: 4.1 MMOL/L (ref 3.5–5.1)
PROT SERPL-MCNC: 6.8 G/DL (ref 6.4–8.2)
RBC # BLD AUTO: 5.14 X10(6)UL
SODIUM SERPL-SCNC: 139 MMOL/L (ref 136–145)
TRIGL SERPL-MCNC: 60 MG/DL (ref 30–149)
TSI SER-ACNC: 2.36 MIU/ML (ref 0.36–3.74)
VLDLC SERPL CALC-MCNC: 9 MG/DL (ref 0–30)
WBC # BLD AUTO: 7.9 X10(3) UL (ref 4–11)

## 2023-02-24 PROCEDURE — 84443 ASSAY THYROID STIM HORMONE: CPT

## 2023-02-24 PROCEDURE — 85025 COMPLETE CBC W/AUTO DIFF WBC: CPT

## 2023-02-24 PROCEDURE — 83036 HEMOGLOBIN GLYCOSYLATED A1C: CPT

## 2023-02-24 PROCEDURE — 80053 COMPREHEN METABOLIC PANEL: CPT

## 2023-02-24 PROCEDURE — 82043 UR ALBUMIN QUANTITATIVE: CPT

## 2023-02-24 PROCEDURE — 36415 COLL VENOUS BLD VENIPUNCTURE: CPT

## 2023-02-24 PROCEDURE — 82570 ASSAY OF URINE CREATININE: CPT

## 2023-02-24 PROCEDURE — 3044F HG A1C LEVEL LT 7.0%: CPT | Performed by: FAMILY MEDICINE

## 2023-02-24 PROCEDURE — 80061 LIPID PANEL: CPT

## 2023-02-24 PROCEDURE — 3061F NEG MICROALBUMINURIA REV: CPT | Performed by: FAMILY MEDICINE

## 2023-03-03 ENCOUNTER — OFFICE VISIT (OUTPATIENT)
Dept: FAMILY MEDICINE CLINIC | Facility: CLINIC | Age: 58
End: 2023-03-03
Payer: COMMERCIAL

## 2023-03-03 VITALS
OXYGEN SATURATION: 99 % | TEMPERATURE: 98 F | WEIGHT: 231 LBS | HEIGHT: 69 IN | BODY MASS INDEX: 34.21 KG/M2 | RESPIRATION RATE: 16 BRPM | HEART RATE: 64 BPM | DIASTOLIC BLOOD PRESSURE: 80 MMHG | SYSTOLIC BLOOD PRESSURE: 130 MMHG

## 2023-03-03 DIAGNOSIS — I10 ESSENTIAL HYPERTENSION: ICD-10-CM

## 2023-03-03 DIAGNOSIS — E11.649 CONTROLLED TYPE 2 DIABETES MELLITUS WITH HYPOGLYCEMIA, WITHOUT LONG-TERM CURRENT USE OF INSULIN (HCC): ICD-10-CM

## 2023-03-03 DIAGNOSIS — E78.00 HYPERCHOLESTEROLEMIA: ICD-10-CM

## 2023-03-03 DIAGNOSIS — Z00.00 ROUTINE GENERAL MEDICAL EXAMINATION AT A HEALTH CARE FACILITY: Primary | ICD-10-CM

## 2023-03-03 DIAGNOSIS — Z23 NEED FOR VACCINATION: ICD-10-CM

## 2023-03-03 PROCEDURE — 90715 TDAP VACCINE 7 YRS/> IM: CPT | Performed by: FAMILY MEDICINE

## 2023-03-03 PROCEDURE — 99213 OFFICE O/P EST LOW 20 MIN: CPT | Performed by: FAMILY MEDICINE

## 2023-03-03 PROCEDURE — 90471 IMMUNIZATION ADMIN: CPT | Performed by: FAMILY MEDICINE

## 2023-03-03 PROCEDURE — 99396 PREV VISIT EST AGE 40-64: CPT | Performed by: FAMILY MEDICINE

## 2023-03-03 PROCEDURE — 3075F SYST BP GE 130 - 139MM HG: CPT | Performed by: FAMILY MEDICINE

## 2023-03-03 PROCEDURE — 3008F BODY MASS INDEX DOCD: CPT | Performed by: FAMILY MEDICINE

## 2023-03-03 PROCEDURE — 3079F DIAST BP 80-89 MM HG: CPT | Performed by: FAMILY MEDICINE

## 2023-03-03 RX ORDER — PIOGLITAZONEHYDROCHLORIDE 15 MG/1
TABLET ORAL
COMMUNITY
Start: 2023-02-02 | End: 2023-03-03

## 2023-03-03 RX ORDER — DULAGLUTIDE 1.5 MG/.5ML
1.5 INJECTION, SOLUTION SUBCUTANEOUS WEEKLY
Qty: 12 EACH | Refills: 1 | Status: SHIPPED | OUTPATIENT
Start: 2023-03-03 | End: 2023-06-01

## 2023-05-06 DIAGNOSIS — E11.9 CONTROLLED TYPE 2 DIABETES MELLITUS WITHOUT COMPLICATION, WITHOUT LONG-TERM CURRENT USE OF INSULIN (HCC): ICD-10-CM

## 2023-05-06 DIAGNOSIS — E78.00 HYPERCHOLESTEROLEMIA: ICD-10-CM

## 2023-05-08 RX ORDER — PIOGLITAZONEHYDROCHLORIDE 15 MG/1
TABLET ORAL
Qty: 90 TABLET | Refills: 0 | Status: SHIPPED | OUTPATIENT
Start: 2023-05-08

## 2023-05-08 RX ORDER — ATORVASTATIN CALCIUM 40 MG/1
TABLET, FILM COATED ORAL
Qty: 90 TABLET | Refills: 0 | Status: SHIPPED | OUTPATIENT
Start: 2023-05-08

## 2023-05-08 NOTE — TELEPHONE ENCOUNTER
Cholesterol Medication Protocol Passed 05/06/2023 10:21 AM   Protocol Details  ALT < 80    ALT resulted within past year    Lipid panel within past 12 months    Appointment within past 12 or next 3 months        LOV 3/3/23     LAST LAB  2/24/23     LAST RX  11/2/23 360     Next OV   Future Appointments   Date Time Provider Florinda Jones   9/8/2023  9:00 AM Mary Bravo MD [facility]         PROTOCOL pass

## 2023-05-29 DIAGNOSIS — E11.649 CONTROLLED TYPE 2 DIABETES MELLITUS WITH HYPOGLYCEMIA, WITHOUT LONG-TERM CURRENT USE OF INSULIN (HCC): ICD-10-CM

## 2023-05-31 RX ORDER — DULAGLUTIDE 1.5 MG/.5ML
1.5 INJECTION, SOLUTION SUBCUTANEOUS WEEKLY
Qty: 12 EACH | Refills: 1 | Status: SHIPPED | OUTPATIENT
Start: 2023-05-31 | End: 2023-08-29

## 2023-06-22 DIAGNOSIS — K21.00 GASTROESOPHAGEAL REFLUX DISEASE WITH ESOPHAGITIS, UNSPECIFIED WHETHER HEMORRHAGE: ICD-10-CM

## 2023-06-23 RX ORDER — PANTOPRAZOLE SODIUM 40 MG/1
TABLET, DELAYED RELEASE ORAL
Qty: 90 TABLET | Refills: 1 | Status: SHIPPED | OUTPATIENT
Start: 2023-06-23

## 2023-07-16 DIAGNOSIS — E11.9 CONTROLLED TYPE 2 DIABETES MELLITUS WITHOUT COMPLICATION, WITHOUT LONG-TERM CURRENT USE OF INSULIN (HCC): ICD-10-CM

## 2023-07-17 RX ORDER — METFORMIN HYDROCHLORIDE 500 MG/1
1000 TABLET, EXTENDED RELEASE ORAL 2 TIMES DAILY WITH MEALS
Qty: 360 TABLET | Refills: 1 | OUTPATIENT
Start: 2023-07-17

## 2023-07-17 RX ORDER — METFORMIN HYDROCHLORIDE 500 MG/1
1000 TABLET, EXTENDED RELEASE ORAL 2 TIMES DAILY WITH MEALS
Qty: 360 TABLET | Refills: 0 | Status: SHIPPED | OUTPATIENT
Start: 2023-07-17

## 2023-08-12 DIAGNOSIS — E78.00 HYPERCHOLESTEROLEMIA: ICD-10-CM

## 2023-08-12 DIAGNOSIS — E11.9 CONTROLLED TYPE 2 DIABETES MELLITUS WITHOUT COMPLICATION, WITHOUT LONG-TERM CURRENT USE OF INSULIN (HCC): ICD-10-CM

## 2023-08-14 RX ORDER — ATORVASTATIN CALCIUM 40 MG/1
TABLET, FILM COATED ORAL
Qty: 90 TABLET | Refills: 0 | Status: SHIPPED | OUTPATIENT
Start: 2023-08-14

## 2023-08-14 RX ORDER — PIOGLITAZONEHYDROCHLORIDE 15 MG/1
TABLET ORAL
Qty: 90 TABLET | Refills: 0 | Status: SHIPPED | OUTPATIENT
Start: 2023-08-14

## 2023-08-14 NOTE — TELEPHONE ENCOUNTER
Diabetic Medication Protocol [id number]/12/2023 09:25 AM   Protocol Details HgBA1C procedure resulted in past 6 months    Last HgBA1C < 7.5    Microalbumin procedure in past 12 months or taking ACE/ARB    Appointment in past 6 or next 3 months        LOV 3/3/23 dr lyman     LAST LAB  2/24/23     LAST RX  5/8/23 90     Next OV   Future Appointments   Date Time Provider Florinda Sandersi   9/8/2023  9:00 AM Ruth Lu MD [facility]         PROTOCOL  pass

## 2023-09-01 ENCOUNTER — LAB ENCOUNTER (OUTPATIENT)
Dept: LAB | Age: 58
End: 2023-09-01
Attending: FAMILY MEDICINE
Payer: COMMERCIAL

## 2023-09-01 DIAGNOSIS — E11.649 CONTROLLED TYPE 2 DIABETES MELLITUS WITH HYPOGLYCEMIA, WITHOUT LONG-TERM CURRENT USE OF INSULIN (HCC): ICD-10-CM

## 2023-09-01 DIAGNOSIS — Z00.00 ROUTINE GENERAL MEDICAL EXAMINATION AT A HEALTH CARE FACILITY: ICD-10-CM

## 2023-09-01 LAB
ALBUMIN SERPL-MCNC: 3.7 G/DL (ref 3.4–5)
ALBUMIN/GLOB SERPL: 1.1 {RATIO} (ref 1–2)
ALP LIVER SERPL-CCNC: 75 U/L
ALT SERPL-CCNC: 23 U/L
ANION GAP SERPL CALC-SCNC: 4 MMOL/L (ref 0–18)
AST SERPL-CCNC: 18 U/L (ref 15–37)
BILIRUB SERPL-MCNC: 0.9 MG/DL (ref 0.1–2)
BUN BLD-MCNC: 17 MG/DL (ref 7–18)
CALCIUM BLD-MCNC: 9 MG/DL (ref 8.5–10.1)
CHLORIDE SERPL-SCNC: 107 MMOL/L (ref 98–112)
CHOLEST SERPL-MCNC: 105 MG/DL (ref ?–200)
CO2 SERPL-SCNC: 26 MMOL/L (ref 21–32)
CREAT BLD-MCNC: 1.01 MG/DL
CREAT UR-SCNC: 133 MG/DL
EGFRCR SERPLBLD CKD-EPI 2021: 87 ML/MIN/1.73M2 (ref 60–?)
EST. AVERAGE GLUCOSE BLD GHB EST-MCNC: 128 MG/DL (ref 68–126)
FASTING PATIENT LIPID ANSWER: YES
FASTING STATUS PATIENT QL REPORTED: YES
GLOBULIN PLAS-MCNC: 3.4 G/DL (ref 2.8–4.4)
GLUCOSE BLD-MCNC: 120 MG/DL (ref 70–99)
HBA1C MFR BLD: 6.1 % (ref ?–5.7)
HDLC SERPL-MCNC: 44 MG/DL (ref 40–59)
LDLC SERPL CALC-MCNC: 47 MG/DL (ref ?–100)
MICROALBUMIN UR-MCNC: <0.5 MG/DL
NONHDLC SERPL-MCNC: 61 MG/DL (ref ?–130)
OSMOLALITY SERPL CALC.SUM OF ELEC: 287 MOSM/KG (ref 275–295)
POTASSIUM SERPL-SCNC: 3.9 MMOL/L (ref 3.5–5.1)
PROT SERPL-MCNC: 7.1 G/DL (ref 6.4–8.2)
SODIUM SERPL-SCNC: 137 MMOL/L (ref 136–145)
TRIGL SERPL-MCNC: 61 MG/DL (ref 30–149)
VLDLC SERPL CALC-MCNC: 9 MG/DL (ref 0–30)

## 2023-09-01 PROCEDURE — 80053 COMPREHEN METABOLIC PANEL: CPT

## 2023-09-01 PROCEDURE — 36415 COLL VENOUS BLD VENIPUNCTURE: CPT

## 2023-09-01 PROCEDURE — 82043 UR ALBUMIN QUANTITATIVE: CPT

## 2023-09-01 PROCEDURE — 80061 LIPID PANEL: CPT

## 2023-09-01 PROCEDURE — 83036 HEMOGLOBIN GLYCOSYLATED A1C: CPT

## 2023-09-01 PROCEDURE — 82570 ASSAY OF URINE CREATININE: CPT

## 2023-09-08 ENCOUNTER — TELEPHONE (OUTPATIENT)
Dept: FAMILY MEDICINE CLINIC | Facility: CLINIC | Age: 58
End: 2023-09-08

## 2023-09-08 NOTE — TELEPHONE ENCOUNTER
Patient returning NOS call     Per patient, was unaware appt was at 9 am, thought it was at noon. Advised patient appt was made in March and confirmed on 9/1/23 with no changes having been made after confirmation. Offered to reschedule appt. Per patient, does not want to reschedule at this time due to the NOS Charge. Per patient, if Dr. Glenis Abdi wants to speak with patient or make changes, just let patient know.

## 2023-11-01 ENCOUNTER — TELEPHONE (OUTPATIENT)
Dept: FAMILY MEDICINE CLINIC | Facility: CLINIC | Age: 58
End: 2023-11-01

## 2023-11-01 NOTE — TELEPHONE ENCOUNTER
Pt calling stating he hurt his right knee maybe Sunday or Monday. He is not sure how but it is slightly swollen and hurts to walk. He is asking for an ortho rec.  Please advise

## 2023-11-28 DIAGNOSIS — E78.00 HYPERCHOLESTEROLEMIA: ICD-10-CM

## 2023-11-28 DIAGNOSIS — E11.9 CONTROLLED TYPE 2 DIABETES MELLITUS WITHOUT COMPLICATION, WITHOUT LONG-TERM CURRENT USE OF INSULIN (HCC): ICD-10-CM

## 2023-11-28 NOTE — TELEPHONE ENCOUNTER
LOV 9/8/2023    LAST LAB 9-1-23    LAST RX 8-14-23 90*0     Next OV   Future Appointments   Date Time Provider Florinda Jones   3/4/2024 12:30 PM Angela Izaguirre MD [facility]       PROTOCOL   Name from pharmacy: METFORMIN ER 500MG 24HR TABS         Will file in chart as: METFORMIN  MG Oral Tablet 24 Hr    Sig: TAKE 2 TABLETS(1000 MG) BY MOUTH IN THE MORNING AND IN THE EVENING WITH MEALS    Disp: 360 tablet    Refills: 0 (Pharmacy requested: Not specified)    Start: 11/28/2023    Class: Normal    Non-formulary For: Controlled type 2 diabetes mellitus without complication, without long-term current use of insulin (HCC)    Last ordered: 4 months ago (7/17/2023) by Bryan Pollock MD    Last refill: 7/17/2023    Rx #: [id number]    Diabetic Medication Protocol [id number]/28/2023 05:51 AM   Protocol Details Appointment in past 6 or next 3 months    HgBA1C procedure resulted in past 6 months    Last HgBA1C < 7.5    Microalbumin procedure in past 12 months or taking ACE/ARB       Name from pharmacy: ATORVASTATIN 40MG TABLETS         Will file in chart as: ATORVASTATIN 40 MG Oral Tab    Sig: TAKE 1 TABLET(40 MG) BY MOUTH EVERY NIGHT    Disp: 90 tablet    Refills: 0 (Pharmacy requested: Not specified)    Start: 11/28/2023    Class: Normal    Non-formulary For: Hypercholesterolemia    Last ordered: 3 months ago (8/14/2023) by Bryan Pollock MD    Last refill: 8/14/2023    Rx #: [id number]    Cholesterol Medication Protocol [id number]/28/2023 05:51 AM   Protocol Details ALT < 80    ALT resulted within past year    Lipid panel within past 12 months    Appointment within past 12 or next 3 months       Name from pharmacy: PIOGLITAZONE 15MG TABLETS         Will file in chart as: PIOGLITAZONE 15 MG Oral Tab    Sig: TAKE 1 TABLET(15 MG) BY MOUTH IN THE MORNING    Disp: 90 tablet    Refills: 0 (Pharmacy requested: Not specified)    Start: 11/28/2023    Class: Normal    Non-formulary For: Controlled type 2 diabetes mellitus without complication, without long-term current use of insulin ([facility] [address]    Last ordered: 3 months ago (8/14/2023) by Diego Whitlock MD    Last refill: 8/14/2023    Rx #: [id number]    Diabetic Medication Protocol [id number]/28/2023 05:51 AM   Protocol Details Appointment in past 6 or next 3 months    HgBA1C procedure resulted in past 6 months    Last HgBA1C < 7.5    Microalbumin procedure in past 12 months or taking ACE/ARB

## 2023-11-28 NOTE — TELEPHONE ENCOUNTER
Spoke to pt who scheduled an appt to see Dr. Erica Mohs for DOS 1/5/24 @ 2;40 pm.      Pt requesting partial prescriptions to be sent to [facility].

## 2023-11-29 DIAGNOSIS — E78.00 HYPERCHOLESTEROLEMIA: ICD-10-CM

## 2023-11-29 DIAGNOSIS — E11.9 CONTROLLED TYPE 2 DIABETES MELLITUS WITHOUT COMPLICATION, WITHOUT LONG-TERM CURRENT USE OF INSULIN (HCC): ICD-10-CM

## 2023-11-29 RX ORDER — PIOGLITAZONEHYDROCHLORIDE 15 MG/1
TABLET ORAL
Qty: 45 TABLET | Refills: 0 | Status: SHIPPED | OUTPATIENT
Start: 2023-11-29

## 2023-11-29 RX ORDER — METFORMIN HYDROCHLORIDE 500 MG/1
1000 TABLET, EXTENDED RELEASE ORAL 2 TIMES DAILY WITH MEALS
Qty: 180 TABLET | Refills: 0 | Status: SHIPPED | OUTPATIENT
Start: 2023-11-29

## 2023-11-29 RX ORDER — ATORVASTATIN CALCIUM 40 MG/1
TABLET, FILM COATED ORAL
Qty: 45 TABLET | Refills: 0 | Status: SHIPPED | OUTPATIENT
Start: 2023-11-29

## 2023-11-30 RX ORDER — METFORMIN HYDROCHLORIDE 500 MG/1
1000 TABLET, EXTENDED RELEASE ORAL 2 TIMES DAILY WITH MEALS
Qty: 360 TABLET | Refills: 0 | OUTPATIENT
Start: 2023-11-30

## 2023-11-30 RX ORDER — PIOGLITAZONEHYDROCHLORIDE 15 MG/1
TABLET ORAL
Qty: 90 TABLET | Refills: 0 | OUTPATIENT
Start: 2023-11-30

## 2023-11-30 RX ORDER — ATORVASTATIN CALCIUM 40 MG/1
TABLET, FILM COATED ORAL
Qty: 90 TABLET | Refills: 0 | OUTPATIENT
Start: 2023-11-30

## 2024-01-05 ENCOUNTER — OFFICE VISIT (OUTPATIENT)
Dept: FAMILY MEDICINE CLINIC | Facility: CLINIC | Age: 59
End: 2024-01-05
Payer: COMMERCIAL

## 2024-01-05 VITALS
OXYGEN SATURATION: 98 % | BODY MASS INDEX: 33.38 KG/M2 | HEART RATE: 89 BPM | HEIGHT: 69 IN | TEMPERATURE: 97 F | DIASTOLIC BLOOD PRESSURE: 88 MMHG | SYSTOLIC BLOOD PRESSURE: 130 MMHG | WEIGHT: 225.38 LBS | RESPIRATION RATE: 16 BRPM

## 2024-01-05 DIAGNOSIS — Z13.0 SCREENING FOR DEFICIENCY ANEMIA: ICD-10-CM

## 2024-01-05 DIAGNOSIS — I10 ESSENTIAL HYPERTENSION: ICD-10-CM

## 2024-01-05 DIAGNOSIS — Z13.29 SCREENING FOR THYROID DISORDER: ICD-10-CM

## 2024-01-05 DIAGNOSIS — E78.00 HYPERCHOLESTEROLEMIA: ICD-10-CM

## 2024-01-05 DIAGNOSIS — K21.00 GASTROESOPHAGEAL REFLUX DISEASE WITH ESOPHAGITIS, UNSPECIFIED WHETHER HEMORRHAGE: ICD-10-CM

## 2024-01-05 DIAGNOSIS — E11.9 CONTROLLED TYPE 2 DIABETES MELLITUS WITHOUT COMPLICATION, WITHOUT LONG-TERM CURRENT USE OF INSULIN (HCC): Primary | ICD-10-CM

## 2024-01-05 DIAGNOSIS — Z23 NEED FOR VACCINATION: ICD-10-CM

## 2024-01-05 PROCEDURE — 90686 IIV4 VACC NO PRSV 0.5 ML IM: CPT | Performed by: FAMILY MEDICINE

## 2024-01-05 PROCEDURE — 90472 IMMUNIZATION ADMIN EACH ADD: CPT | Performed by: FAMILY MEDICINE

## 2024-01-05 PROCEDURE — 99214 OFFICE O/P EST MOD 30 MIN: CPT | Performed by: FAMILY MEDICINE

## 2024-01-05 PROCEDURE — 90471 IMMUNIZATION ADMIN: CPT | Performed by: FAMILY MEDICINE

## 2024-01-05 PROCEDURE — 90677 PCV20 VACCINE IM: CPT | Performed by: FAMILY MEDICINE

## 2024-01-05 PROCEDURE — 3008F BODY MASS INDEX DOCD: CPT | Performed by: FAMILY MEDICINE

## 2024-01-05 PROCEDURE — 3079F DIAST BP 80-89 MM HG: CPT | Performed by: FAMILY MEDICINE

## 2024-01-05 PROCEDURE — 3075F SYST BP GE 130 - 139MM HG: CPT | Performed by: FAMILY MEDICINE

## 2024-01-05 RX ORDER — ATORVASTATIN CALCIUM 40 MG/1
40 TABLET, FILM COATED ORAL NIGHTLY
Qty: 90 TABLET | Refills: 1 | Status: SHIPPED | OUTPATIENT
Start: 2024-01-05

## 2024-01-05 RX ORDER — DULAGLUTIDE 1.5 MG/.5ML
1.5 INJECTION, SOLUTION SUBCUTANEOUS
Refills: 0 | Status: CANCELLED | OUTPATIENT
Start: 2024-01-05

## 2024-01-05 RX ORDER — DULAGLUTIDE 1.5 MG/.5ML
1.5 INJECTION, SOLUTION SUBCUTANEOUS WEEKLY
Qty: 12 ML | Refills: 1 | Status: SHIPPED | OUTPATIENT
Start: 2024-01-05

## 2024-01-05 RX ORDER — DULAGLUTIDE 1.5 MG/.5ML
1.5 INJECTION, SOLUTION SUBCUTANEOUS
COMMUNITY
Start: 2023-10-24

## 2024-01-05 RX ORDER — PANTOPRAZOLE SODIUM 40 MG/1
TABLET, DELAYED RELEASE ORAL
Qty: 90 TABLET | Refills: 1 | Status: SHIPPED | OUTPATIENT
Start: 2024-01-05

## 2024-01-05 RX ORDER — PIOGLITAZONEHYDROCHLORIDE 15 MG/1
15 TABLET ORAL EVERY MORNING
Qty: 90 TABLET | Refills: 1 | Status: CANCELLED | OUTPATIENT
Start: 2024-01-05

## 2024-01-05 RX ORDER — LOSARTAN POTASSIUM 50 MG/1
50 TABLET ORAL DAILY
Qty: 90 TABLET | Refills: 1 | Status: SHIPPED | OUTPATIENT
Start: 2024-01-05

## 2024-01-05 NOTE — PROGRESS NOTES
Chris Timmons is a 58 year old male.  Chief Complaint   Patient presents with    Medication Follow-Up    Diabetes     HPI:   Chris Timmons is a 58 year old male with diabetes, hypertension and hyperlipidemia seen for follow-up.    Compliant with his diabetes medications, low-carb diet and exercise, patient states has continued to lose weight.  Tolerating medication well without any side effects.  Eye exam is up-to-date.  Denies increased urination or thirst or any episodes of hypoglycemia.    Compliant with his cholesterol medication, tolerating medication well without any side effects.    Compliant with his blood pressure medication and low-salt diet, tolerating medication well without any side effects.    ALLERGY:   No Known Allergies    MEDICATIONS:     Current Outpatient Medications   Medication Sig Dispense Refill    TRULICITY 1.5 MG/0.5ML Subcutaneous Solution Pen-injector Inject 1.5 mg into the skin.      metFORMIN  MG Oral Tablet 24 Hr TAKE 2 TABLETS(1000 MG) BY MOUTH IN THE MORNING AND IN THE EVENING WITH MEALS (Patient taking differently: Take 2 tablets (1,000 mg total) by mouth 2 (two) times daily with meals. 1000 mg in am) 180 tablet 0    atorvastatin 40 MG Oral Tab TAKE 1 TABLET(40 MG) BY MOUTH EVERY NIGHT 45 tablet 0    pioglitazone 15 MG Oral Tab TAKE 1 TABLET(15 MG) BY MOUTH IN THE MORNING 45 tablet 0    pantoprazole 40 MG Oral Tab EC TAKE 1 TABLET(40 MG) BY MOUTH EVERY DAY 30 MINUTES BEFORE BREAKFAST 90 tablet 1    losartan 50 MG Oral Tab Take 1 tablet (50 mg total) by mouth in the morning. 90 tablet 1    PEG 3350-KCl-Na Bicarb-NaCl 420 g Oral Recon Soln Take as directed by physician (Patient not taking: Reported on 1/5/2024) 4000 mL 0      Past Medical History:   Diagnosis Date    Arthritis     Flatulence/gas pain/belching     Heartburn 2/1/20    Osteoarthritis     left shoulder    Type II or unspecified type diabetes mellitus without mention of complication, not stated as uncontrolled  3/27/2014    Visual impairment     reading glasses      Social History:  Social History     Socioeconomic History    Marital status:     Number of children: 2   Occupational History    Occupation: Service (customer service)     Comment: [employer]    Tobacco Use    Smoking status: Never     Passive exposure: Never    Smokeless tobacco: Never   Vaping Use    Vaping Use: Never used   Substance and Sexual Activity    Alcohol use: Yes     Alcohol/week: 1.0 - 2.0 standard drink of alcohol     Types: 1 - 2 Standard drinks or equivalent per week     Comment: 1 drink per week    Drug use: No    Sexual activity: Yes     Partners: Female   Other Topics Concern     Service No    Blood Transfusions No    Caffeine Concern Yes    Occupational Exposure No    Hobby Hazards No    Sleep Concern Yes     Comment: up at least once/night, sometimes x 2, hard to get back asleep    Stress Concern No    Weight Concern Yes    Special Diet Yes    Exercise Yes    Seat Belt Yes   Social History Narrative    Children 19 (daughter, , Ailyn [place] in [place]) & 16 (son). ([facility]). No pets.        REVIEW OF SYSTEMS:   GENERAL HEALTH: feels well otherwise  SKIN: denies any unusual skin lesions or rashes  RESPIRATORY: denies shortness of breath with exertion  CARDIOVASCULAR: denies chest pain on exertion  GI: denies abdominal pain and denies heartburn  NEURO: denies headaches    EXAM:   /88   Pulse 89   Temp 97.3 °F (36.3 °C) (Temporal)   Resp 16   Ht 5' 9\" (1.753 m)   Wt 225 lb 6 oz (102.2 kg)   SpO2 98%   BMI 33.28 kg/m²   GENERAL: obese,in no apparent distress  SKIN: no rashes,no suspicious lesions  HEENT: atraumatic, normocephalic,ears and throat are clear  NECK: supple,no adenopathy,no bruits  LUNGS: clear to auscultation  CARDIO: RRR without murmur  GI: good BS's,no masses, HSM or tenderness  EXTREMITIES: no cyanosis, clubbing or edema  Bilateral barefoot skin diabetic exam is normal,  visualized feet and the appearance is normal.  Bilateral monofilament/sensation of both feet is normal.  Pulsation pedal pulse exam of both lower legs/feet is normal as well.      Component      Latest Ref Rng 9/1/2023   Glucose      70 - 99 mg/dL 120 (H)    Sodium      136 - 145 mmol/L 137    Potassium      3.5 - 5.1 mmol/L 3.9    Chloride      98 - 112 mmol/L 107    Carbon Dioxide, Total      21.0 - 32.0 mmol/L 26.0    ANION GAP      0 - 18 mmol/L 4    BUN      7 - 18 mg/dL 17    CREATININE      0.70 - 1.30 mg/dL 1.01    CALCIUM      8.5 - 10.1 mg/dL 9.0    CALCULATED OSMOLALITY      275 - 295 mOsm/kg 287    EGFR      >=60 mL/min/1.73m2 87    AST (SGOT)      15 - 37 U/L 18    ALT (SGPT)      16 - 61 U/L 23    ALKALINE PHOSPHATASE      45 - 117 U/L 75    Total Bilirubin      0.1 - 2.0 mg/dL 0.9    PROTEIN, TOTAL      6.4 - 8.2 g/dL 7.1    Albumin      3.4 - 5.0 g/dL 3.7    Globulin      2.8 - 4.4 g/dL 3.4    A/G Ratio      1.0 - 2.0  1.1    Patient Fasting for CMP? Yes    Cholesterol, Total      <200 mg/dL 105    HDL Cholesterol      40 - 59 mg/dL 44    Triglycerides      30 - 149 mg/dL 61    LDL Cholesterol Calc      <100 mg/dL 47    VLDL      0 - 30 mg/dL 9    NON-HDL CHOLESTEROL      <130 mg/dL 61    Patient Fasting for Lipid? Yes    MALB URINE      mg/dL <0.50    CREATININE UR RANDOM      mg/dL 133.00    MALB/CRE CALC --    HEMOGLOBIN A1c      <5.7 % 6.1 (H)    ESTIMATED AVERAGE GLUCOSE      68 - 126 mg/dL 128 (H)       Legend:  (H) High  ASSESSMENT AND PLAN:   Chris was seen today for medication follow-up and diabetes.    Diagnoses and all orders for this visit:    Controlled type 2 diabetes mellitus without complication, without long-term current use of insulin (HCC)  -     Dulaglutide (TRULICITY) 1.5 MG/0.5ML Subcutaneous Solution Pen-injector; Inject 1.5 mg into the skin once a week.  -     Hemoglobin A1C; Future  -     Comp Metabolic Panel (14); Future  -     Microalb/Creat Ratio, Random Urine; Future  -      HgbA1c 6.1  -     Advised to hold Pioglitazone and continue Trulicity  -     continue to limit refined sugars and carbohydrates in diet.    Gastroesophageal reflux disease with esophagitis, unspecified whether hemorrhage controlled  -     pantoprazole 40 MG Oral Tab EC; TAKE 1 TABLET(40 MG) BY MOUTH EVERY DAY 30 MINUTES BEFORE BREAKFAST  -     continue the same dose of medication    Hypercholesterolemia controlled         -     Lipid Panel; Future  -     atorvastatin 40 MG Oral Tab; Take 1 tablet (40 mg total) by mouth nightly.  -     continue the same dose of medication  -     limit saturated fats and cholesterol in diet.    Essential hypertension well controlled  -     losartan 50 MG Oral Tab; Take 1 tablet (50 mg total) by mouth daily.  -     continue the same dose of medication  -      limit salt in diet  -     Goal BP is less than 130/80    Need for vaccination  -     Prevnar 20 (PCV20) [18194]  -     INFLUENZA VAC, QUAD, PRSV FREE, 0.5 ML    Screening for deficiency anemia  -     CBC With Differential With Platelet; Future    Screening for thyroid disorder  -     Assay, Thyroid Stim Hormone; Future    Return in about 6 months (around 7/5/2024) for Diabetes f/u, HTN f/u, hyperlipidemia, annual.       The 21st Century Cures Act makes medical notes like these available to patients in the interest of transparency. Please be advised this is a medical document. Medical documents are intended to carry relevant information, facts as evident, and the clinical opinion of the practitioner. The medical note is intended as peer to peer communication and may appear blunt or direct. It is written in medical language and may contain abbreviations or verbiage that are unfamiliar.

## 2024-02-15 ENCOUNTER — TELEPHONE (OUTPATIENT)
Dept: FAMILY MEDICINE CLINIC | Facility: CLINIC | Age: 59
End: 2024-02-15

## 2024-02-15 DIAGNOSIS — E11.649 CONTROLLED TYPE 2 DIABETES MELLITUS WITH HYPOGLYCEMIA, WITHOUT LONG-TERM CURRENT USE OF INSULIN (HCC): Primary | ICD-10-CM

## 2024-02-15 NOTE — TELEPHONE ENCOUNTER
Pt called back and states that there is no Trulicity at [facility] nationwide, and he called [facility] and [facility] with no luck as well within a 20+ mile radius. He is asking if something else can be prescribed

## 2024-02-15 NOTE — TELEPHONE ENCOUNTER
Pt called and states that he can not get his Trulicity filled at his [facility] since it is out of stock with no expected stock date. Pt will call us back with other pharmacy to send the Rx to so he can get it filled. In the meantime, is there anything else he can do?   Subjective   Patient is a 45 y.o. male presenting with fall.   History provided by:  Patient  Fall   Mechanism of injury: fall    Injury location:  Torso  Torso injury location:  R chest and L chest  Incident location:  Outdoors  Time since incident:  3 days  Arrived directly from scene: no    Fall:     Fall occurred:  From a ladder    Impact surface:  Dirt    Entrapped after fall: no    Suspicion of alcohol use: no    Suspicion of drug use: no    Associated symptoms: no abdominal pain and no chest pain        Review of Systems   Constitutional: Negative.  Negative for fever.   HENT: Negative.    Respiratory: Negative.    Cardiovascular: Negative.  Negative for chest pain.   Gastrointestinal: Negative.  Negative for abdominal pain.   Endocrine: Negative.    Genitourinary: Negative.  Negative for dysuria.   Skin: Negative.    Neurological: Negative.    Psychiatric/Behavioral: Negative.    All other systems reviewed and are negative.      No past medical history on file.    No Known Allergies    Past Surgical History:   Procedure Laterality Date   • APPENDECTOMY     • CARDIAC CATHETERIZATION         Family History   Problem Relation Age of Onset   • Depression Mother    • Anxiety disorder Father    • Depression Father    • Alcohol abuse Brother    • Bipolar disorder Cousin    • ADD / ADHD Neg Hx    • Dementia Neg Hx    • OCD Neg Hx    • Paranoid behavior Neg Hx    • Schizophrenia Neg Hx    • Seizures Neg Hx    • Self-Injurious Behavior  Neg Hx    • Suicide Attempts Neg Hx        Social History     Social History   • Marital status:      Spouse name: N/A   • Number of children: N/A   • Years of education: N/A     Social History Main Topics   • Smoking status: Current Every Day Smoker     Packs/day: 1.50     Start date: 7/4/1987   • Smokeless tobacco: Not on file   • Alcohol use No   • Drug use: Yes     Special: Oxycodone, Hydrocodone, Morphine   • Sexual activity: Yes     Other Topics Concern   • Not on file      Social History Narrative   • No narrative on file           Objective   Physical Exam   Constitutional: He is oriented to person, place, and time. He appears well-developed and well-nourished. No distress.   HENT:   Head: Normocephalic and atraumatic.   Right Ear: External ear normal.   Left Ear: External ear normal.   Nose: Nose normal.   Eyes: Conjunctivae and EOM are normal. Pupils are equal, round, and reactive to light.   Neck: Normal range of motion. Neck supple. No JVD present. No tracheal deviation present.   Cardiovascular: Normal rate, regular rhythm and normal heart sounds.    No murmur heard.  Pulmonary/Chest: Effort normal and breath sounds normal. No respiratory distress. He has no wheezes. He exhibits tenderness.   Abdominal: Soft. Bowel sounds are normal. There is no tenderness.   Musculoskeletal: Normal range of motion. He exhibits no edema or deformity.   Neurological: He is alert and oriented to person, place, and time. No cranial nerve deficit.   Skin: Skin is warm and dry. No rash noted. He is not diaphoretic. No erythema. No pallor.   Psychiatric: He has a normal mood and affect. His behavior is normal. Thought content normal.   Nursing note and vitals reviewed.      Procedures         ED Course  ED Course                  MDM  Number of Diagnoses or Management Options  Closed fracture of multiple ribs of right side, initial encounter: new and requires workup     Amount and/or Complexity of Data Reviewed  Tests in the radiology section of CPT®: ordered and reviewed  Independent visualization of images, tracings, or specimens: yes    Risk of Complications, Morbidity, and/or Mortality  Presenting problems: minimal  Diagnostic procedures: minimal  Management options: minimal    Patient Progress  Patient progress: stable      Final diagnoses:   Closed fracture of multiple ribs of right side, initial encounter            Rosalie Fatima, NAOMI  07/09/17 0153

## 2024-02-20 ENCOUNTER — OFFICE VISIT (OUTPATIENT)
Dept: FAMILY MEDICINE CLINIC | Facility: CLINIC | Age: 59
End: 2024-02-20
Payer: COMMERCIAL

## 2024-02-20 VITALS
HEART RATE: 73 BPM | SYSTOLIC BLOOD PRESSURE: 120 MMHG | TEMPERATURE: 98 F | BODY MASS INDEX: 34.66 KG/M2 | HEIGHT: 69 IN | WEIGHT: 234 LBS | RESPIRATION RATE: 16 BRPM | OXYGEN SATURATION: 99 % | DIASTOLIC BLOOD PRESSURE: 87 MMHG

## 2024-02-20 DIAGNOSIS — H10.33 ACUTE BACTERIAL CONJUNCTIVITIS OF BOTH EYES: Primary | ICD-10-CM

## 2024-02-20 PROCEDURE — 99213 OFFICE O/P EST LOW 20 MIN: CPT | Performed by: NURSE PRACTITIONER

## 2024-02-20 RX ORDER — OFLOXACIN 3 MG/ML
1-2 SOLUTION/ DROPS OPHTHALMIC 4 TIMES DAILY
Qty: 5 ML | Refills: 0 | Status: SHIPPED | OUTPATIENT
Start: 2024-02-20 | End: 2024-02-27

## 2024-02-20 NOTE — PROGRESS NOTES
CHIEF COMPLAINT:     Chief Complaint   Patient presents with    Eye Problem     Redness, drainage, itching, started yesterday     Sinus pressure started Sunday        HPI:   Chris Timmons is a 58 year old male who presents with chief complaint of \"pink eye\". Symptoms began  2  days ago. Symptoms have been worsening since onset.   Patient reports bilateral eye redness, no eyelid crusting, yellow discharge, mild itching, no eyelid swelling, no tearing. Contact lens wearer: no.   Denies fever, change in vision, sensitivity to light, pain with eye movement, cold symptoms, foreign body sensation, eye injury, or contact with irritant.     Treatments tried: none  Previous eye surgery: no    Current Outpatient Medications   Medication Sig Dispense Refill    ofloxacin 0.3 % Ophthalmic Solution Place 1-2 drops into both eyes 4 (four) times daily for 7 days. In affected eye 5 mL 0    semaglutide 4 MG/3ML Subcutaneous Solution Pen-injector Inject 1 mg into the skin once a week. 4 each 12    pantoprazole 40 MG Oral Tab EC TAKE 1 TABLET(40 MG) BY MOUTH EVERY DAY 30 MINUTES BEFORE BREAKFAST 90 tablet 1    atorvastatin 40 MG Oral Tab Take 1 tablet (40 mg total) by mouth nightly. 90 tablet 1    losartan 50 MG Oral Tab Take 1 tablet (50 mg total) by mouth daily. 90 tablet 1    metFORMIN  MG Oral Tablet 24 Hr TAKE 2 TABLETS(1000 MG) BY MOUTH IN THE MORNING AND IN THE EVENING WITH MEALS (Patient taking differently: Take 2 tablets (1,000 mg total) by mouth 2 (two) times daily with meals. 1000 mg in am) 180 tablet 0    pioglitazone 15 MG Oral Tab TAKE 1 TABLET(15 MG) BY MOUTH IN THE MORNING 45 tablet 0    Dulaglutide (TRULICITY) 1.5 MG/0.5ML Subcutaneous Solution Pen-injector Inject 1.5 mg into the skin once a week. (Patient not taking: Reported on 2/20/2024) 12 mL 1      Past Medical History:   Diagnosis Date    Arthritis     Diabetes mellitus (HCC)     Flatulence/gas pain/belching     Heartburn 02/01/2020    Osteoarthritis      left shoulder    Type II or unspecified type diabetes mellitus without mention of complication, not stated as uncontrolled 03/27/2014    Visual impairment     reading glasses      Past Surgical History:   Procedure Laterality Date    COLONOSCOPY  2017    RECONSTR TOTAL SHOULDER IMPLANT Left 2/17/17    VASECTOMY  2004      Family History   Problem Relation Age of Onset    Diabetes Father     Hypertension Father     Cancer Father 67        stomach    Colon Polyps Father     Alcohol and Other Disorders Associated Father     Hypertension Mother     Hypertension Maternal Grandmother     Diabetes Maternal Grandmother     Cancer Maternal Grandfather         Lung    Cancer Paternal Grandmother         Brain    Other (Other) Sister         benign jaw tumor in muscle, has same other side    Diabetes Self       Social History     Socioeconomic History    Marital status:     Number of children: 2   Occupational History    Occupation: Service (customer service)     Comment: [employer]    Tobacco Use    Smoking status: Never     Passive exposure: Never    Smokeless tobacco: Never   Vaping Use    Vaping Use: Never used   Substance and Sexual Activity    Alcohol use: Yes     Alcohol/week: 4.0 standard drinks of alcohol     Types: 2 Cans of beer, 2 Standard drinks or equivalent per week     Comment: 1 drink per week    Drug use: No    Sexual activity: Yes     Partners: Female   Other Topics Concern     Service No    Blood Transfusions No    Caffeine Concern Yes    Occupational Exposure No    Hobby Hazards No    Sleep Concern Yes     Comment: up at least once/night, sometimes x 2, hard to get back asleep    Stress Concern No    Weight Concern Yes    Special Diet Yes    Exercise Yes    Seat Belt Yes   Social History Narrative    Children 19 (daughter, , [place]ily[place] in [place]) & 16 (son). ([employer]). No pets.         REVIEW OF SYSTEMS:   GENERAL: feels well otherwise  SKIN: no  rashes  EYES:denies blurred vision or double vision. See HPI  HENT: denies ear pain, congestion, sore throat  LUNGS: denies shortness of breath or cough  CARDIOVASCULAR: denies chest pain or palpitations   GI: denies N/V/C or abdominal pain  NEURO: denies headaches     EXAM:   /87   Pulse 73   Temp 97.9 °F (36.6 °C) (Temporal)   Resp 16   Ht 5' 9\" (1.753 m)   Wt 234 lb (106.1 kg)   SpO2 99%   BMI 34.56 kg/m²   GENERAL: well developed, well nourished,in no apparent distress  SKIN: no rashes,no suspicious lesions  EYES: PERRLA, EOMI, normal optic disc; bilateral conjunctiva erythematous, injected, cream discharge, no tearing,  no lid swelling.  No swelling or redness of periorbital tissue.  Vision WNL see VS.   HENT: atraumatic, normocephalic,ears and throat are clear  NECK: supple, non tender  LUNGS: clear to auscultation bilaterally.   CARDIO: RRR without murmur  LYMPH: no preauricular lymphadenopathy. No cervical lymphadenopathy      ASSESSMENT AND PLAN:   Chris Timmons is a 58 year old male who presents with:    ASSESSMENT:   Encounter Diagnosis   Name Primary?    Acute bacterial conjunctivitis of both eyes Yes     1. Acute bacterial conjunctivitis of both eyes  - ofloxacin 0.3 % Ophthalmic Solution; Place 1-2 drops into both eyes 4 (four) times daily for 7 days. In affected eye  Dispense: 5 mL; Refill: 0      PLAN: Hygiene and comfort care as listed in patient instructions.    Medication and instructions as listed below  Warm compresses to affected eye prn.  Advised patient to avoid touching eyes; importancestressed  of good handwashing.    Risks, benefits, complications and side effects of meds discussed.  Can return to work/school after on medication for 24 hours.  Follow up with PCP or eye doctor if not improved in 2-3 days    Requested Prescriptions     Signed Prescriptions Disp Refills    ofloxacin 0.3 % Ophthalmic Solution 5 mL 0     Sig: Place 1-2 drops into both eyes 4 (four) times daily  for 7 days. In affected eye           See pt handout    The patient verbalizes understanding and is in agreement with treatment plan.

## 2024-03-11 ENCOUNTER — TELEPHONE (OUTPATIENT)
Dept: FAMILY MEDICINE CLINIC | Facility: CLINIC | Age: 59
End: 2024-03-11

## 2024-03-11 NOTE — TELEPHONE ENCOUNTER
Spoke to patient, he reports he has had BM, but usually 3x a day and due to Ozempic barely once a day.     Barely going once a day  Since Ozempic started  Scheduled Colonoscopy last Monday - prep didn't clean him out  OTC laxative for 5 days  At times, bloated, left side lower back pain (ranges up to 7/10).     Ozempic started 4 months ago  Didn't take during colonoscopy (per doc)- it wasn't clear   Even the GI doc said pt on Ozempic doesn't clear them completely for the scope. He believes it is from Ozempic and thinks     He did not take the shot this week. He believes it's from Ozempic. Please advise if you want him to be seen?     Patient notified if his symptoms worsen or unbearable to go to UC or ER for immediate evaluation. Patient verbalized understanding.

## 2024-03-11 NOTE — TELEPHONE ENCOUNTER
Pt asking to s/w nurse said he is having severe constipation, thinks it may be side effect of ozempic, pt using otc meds not working, said he did not take last dose of ozempic

## 2024-03-20 ENCOUNTER — OFFICE VISIT (OUTPATIENT)
Dept: FAMILY MEDICINE CLINIC | Facility: CLINIC | Age: 59
End: 2024-03-20
Payer: COMMERCIAL

## 2024-03-20 VITALS
HEART RATE: 96 BPM | OXYGEN SATURATION: 98 % | SYSTOLIC BLOOD PRESSURE: 124 MMHG | BODY MASS INDEX: 33.33 KG/M2 | HEIGHT: 69 IN | TEMPERATURE: 99 F | WEIGHT: 225 LBS | RESPIRATION RATE: 18 BRPM | DIASTOLIC BLOOD PRESSURE: 88 MMHG

## 2024-03-20 DIAGNOSIS — E11.649 CONTROLLED TYPE 2 DIABETES MELLITUS WITH HYPOGLYCEMIA, WITHOUT LONG-TERM CURRENT USE OF INSULIN (HCC): Primary | ICD-10-CM

## 2024-03-20 DIAGNOSIS — K59.03 DRUG-INDUCED CONSTIPATION: ICD-10-CM

## 2024-03-20 PROCEDURE — 99214 OFFICE O/P EST MOD 30 MIN: CPT | Performed by: FAMILY MEDICINE

## 2024-03-20 RX ORDER — DULAGLUTIDE 0.75 MG/.5ML
0.75 INJECTION, SOLUTION SUBCUTANEOUS WEEKLY
Qty: 12 EACH | Refills: 0 | Status: SHIPPED | OUTPATIENT
Start: 2024-03-20 | End: 2024-06-18

## 2024-03-20 NOTE — PROGRESS NOTES
Chris Timmons is a 58 year old male.  Chief Complaint   Patient presents with    Constipation     Trouble with ozempic    Medication Follow-Up     HPI:   Chris Timmons is a 58 year old male with history of diabetes, hypertension and hyperlipidemia complaining of severe constipation since he started using the Ozempic, patient states even for his colonoscopy was not completely cleaned out.  States stopped taking the Ozempic 2 weeks ago and would like to go back on Trulicity, patient was eventually switched to Ozempic as 1.5 mg of the Trulicity was not available.    ALLERGY:   No Known Allergies    MEDICATIONS:     Current Outpatient Medications   Medication Sig Dispense Refill    Dulaglutide (TRULICITY) 0.75 MG/0.5ML Subcutaneous Solution Pen-injector Inject 0.75 mg into the skin once a week. 12 each 0    pantoprazole 40 MG Oral Tab EC TAKE 1 TABLET(40 MG) BY MOUTH EVERY DAY 30 MINUTES BEFORE BREAKFAST 90 tablet 1    losartan 50 MG Oral Tab Take 1 tablet (50 mg total) by mouth daily. 90 tablet 1    metFORMIN  MG Oral Tablet 24 Hr TAKE 2 TABLETS(1000 MG) BY MOUTH IN THE MORNING AND IN THE EVENING WITH MEALS (Patient taking differently: Take 2 tablets (1,000 mg total) by mouth 2 (two) times daily with meals. 1000 mg in am) 180 tablet 0    atorvastatin 40 MG Oral Tab Take 1 tablet (40 mg total) by mouth nightly. 90 tablet 1    Dulaglutide (TRULICITY) 1.5 MG/0.5ML Subcutaneous Solution Pen-injector Inject 1.5 mg into the skin once a week. (Patient not taking: Reported on 2/20/2024) 12 mL 1      Past Medical History:   Diagnosis Date    Arthritis     Diabetes mellitus (HCC)     Flatulence/gas pain/belching     Heartburn 02/01/2020    Osteoarthritis     left shoulder    Type II or unspecified type diabetes mellitus without mention of complication, not stated as uncontrolled 03/27/2014    Visual impairment     reading glasses      Social History:  Social History     Socioeconomic History    Marital status:      Number of children: 2   Occupational History    Occupation: Service (customer service)     Comment: [employer]    Tobacco Use    Smoking status: Never     Passive exposure: Never    Smokeless tobacco: Never   Vaping Use    Vaping Use: Never used   Substance and Sexual Activity    Alcohol use: Yes     Alcohol/week: 4.0 standard drinks of alcohol     Types: 2 Cans of beer, 2 Standard drinks or equivalent per week     Comment: 1 drink per week    Drug use: No    Sexual activity: Yes     Partners: Female   Other Topics Concern     Service No    Blood Transfusions No    Caffeine Concern Yes    Occupational Exposure No    Hobby Hazards No    Sleep Concern Yes     Comment: up at least once/night, sometimes x 2, hard to get back asleep    Stress Concern No    Weight Concern Yes    Special Diet Yes    Exercise Yes    Seat Belt Yes   Social History Narrative    Children 19 (daughter, , Ailyn [place] in [place]) & 16 (son). ([facility]). No pets.        REVIEW OF SYSTEMS:   GENERAL HEALTH: feels well otherwise  SKIN: denies any unusual skin lesions or rashes  RESPIRATORY: denies shortness of breath with exertion  CARDIOVASCULAR: denies chest pain on exertion  GI: denies abdominal pain and denies heartburn  NEURO: denies headaches    EXAM:   /88   Pulse 96   Temp 98.7 °F (37.1 °C) (Temporal)   Resp 18   Ht 5' 9\" (1.753 m)   Wt 225 lb (102.1 kg)   SpO2 98%   BMI 33.23 kg/m²   GENERAL: well developed, well nourished,in no apparent distress  SKIN: no rashes,no suspicious lesions  HEENT: atraumatic, normocephalic,ears and throat are clear  NECK: supple,no adenopathy,no bruits  LUNGS: clear to auscultation  CARDIO: RRR without murmur  GI: good BS's,no masses, HSM or tenderness  EXTREMITIES: no cyanosis, clubbing or edema    ASSESSMENT AND PLAN:   Chris was seen today for constipation and medication follow-up.    Diagnoses and all orders for this visit:    Controlled type 2  diabetes mellitus with hypoglycemia, without long-term current use of insulin (HCC)  -     Dulaglutide (TRULICITY) 0.75 MG/0.5ML Subcutaneous Solution Pen-injector; Inject 0.75 mg into the skin once a week.  -     Discontinued ozempic and restarted Trulicity  -     continue metformin  -     continue to limit refined sugars and carbs in diet    Drug-induced constipation due to ozempic       -    advised to continue fibre and water intake       -    miralax otc daily until has regular soft BM       -    f/u if symptoms are persistent or worsening    Keep f/u appointment in June       The 21st Century Cures Act makes medical notes like these available to patients in the interest of transparency. Please be advised this is a medical document. Medical documents are intended to carry relevant information, facts as evident, and the clinical opinion of the practitioner. The medical note is intended as peer to peer communication and may appear blunt or direct. It is written in medical language and may contain abbreviations or verbiage that are unfamiliar.

## 2024-03-21 DIAGNOSIS — E11.9 CONTROLLED TYPE 2 DIABETES MELLITUS WITHOUT COMPLICATION, WITHOUT LONG-TERM CURRENT USE OF INSULIN (HCC): ICD-10-CM

## 2024-03-22 RX ORDER — METFORMIN HYDROCHLORIDE 500 MG/1
1000 TABLET, EXTENDED RELEASE ORAL 2 TIMES DAILY WITH MEALS
Qty: 360 TABLET | Refills: 0 | Status: SHIPPED | OUTPATIENT
Start: 2024-03-22

## 2024-03-22 NOTE — TELEPHONE ENCOUNTER
LOV 3/20/2024    LAST LAB    LAST RX 11-29-23 180*0     Next OV   Future Appointments   Date Time Provider Department Center   7/12/2024  2:20 PM June Garland MD [facility]       PROTOCOL   Name from pharmacy: METFORMIN ER 500MG 24HR TABS         Will file in chart as: METFORMIN  MG Oral Tablet 24 Hr    Sig: TAKE 2 TABLETS(1000 MG) BY MOUTH IN THE MORNING AND IN THE EVENING WITH MEALS    Disp: 360 tablet    Refills: 0 (Pharmacy requested: Not specified)    Start: 3/21/2024    Class: Normal    For: Controlled type 2 diabetes mellitus without complication, without long-term current use of insulin (HCC)    To pharmacy: **Patient requests 90 days supply**    Last ordered: 3 months ago (11/29/2023) by June Garland MD    Last refill: 2/20/2024    Rx #: [id number]    Diabetes Medication Protocol [id number]/21/2024 05:46 AM   Protocol Details Last A1C < 7.5 and within past 6 months    In person appointment or virtual visit in the past 6 mos or appointment in next 3 mos    Microalbumin procedure in past 12 months or taking ACE/ARB    EGFRCR or GFRNAA > 50    GFR in the past 12 months

## 2024-06-13 DIAGNOSIS — E11.649 CONTROLLED TYPE 2 DIABETES MELLITUS WITH HYPOGLYCEMIA, WITHOUT LONG-TERM CURRENT USE OF INSULIN (HCC): ICD-10-CM

## 2024-06-17 RX ORDER — DULAGLUTIDE 0.75 MG/.5ML
0.75 INJECTION, SOLUTION SUBCUTANEOUS WEEKLY
Qty: 2 ML | Refills: 0 | Status: SHIPPED | OUTPATIENT
Start: 2024-06-17

## 2024-06-17 NOTE — TELEPHONE ENCOUNTER
Please review. Protocol Failed; No Protocol    Requested Prescriptions   Pending Prescriptions Disp Refills    TRULICITY 0.75 MG/0.5ML Subcutaneous Solution Pen-injector [Pharmacy Med Name: TRULICITY 0.75MG/0.5ML INJ (4 PENS)] 6 mL 0     Sig: ADMINISTER 0.75 MG UNDER THE SKIN 1 TIME A WEEK       Diabetes Medication Protocol Failed - 6/13/2024  5:54 PM        Failed - Last A1C < 7.5 and within past 6 months     Lab Results   Component Value Date    A1C 6.1 (H) 09/01/2023             Passed - In person appointment or virtual visit in the past 6 mos or appointment in next 3 mos     Recent Outpatient Visits              2 months ago Controlled type 2 diabetes mellitus with hypoglycemia, without long-term current use of insulin ([facility])    [address] June Ley MD    Office Visit    3 months ago Acute bacterial conjunctivitis of both eyes    [facility], [facility]-In Clinic, [address] [place] [address] Olszewski, Kristen J, APRN    Office Visit    5 months ago Controlled type 2 diabetes mellitus without complication, without long-term current use of insulin ([facility])    [address] June Ley MD    Office Visit    1 year ago Routine general medical examination at a health care facility    [address] June Ley MD    Office Visit    1 year ago Controlled type 2 diabetes mellitus without complication, without long-term current use of insulin ([facility])    [address] June Ley MD    Office Visit          Future Appointments         Provider Department Appt Notes    In 3 weeks June Garland MD [address] annual physical//med check                    Passed - Microalbumin procedure in past 12 months or taking ACE/ARB        Passed - EGFRCR or GFRNAA > 50     GFR  Evaluation  EGFRCR: 87 , resulted on 9/1/2023          Passed - GFR in the past 12 months               Future Appointments         Provider Department Appt Notes    In 3 weeks June Garland MD [facility], [address] [place] [address] annual physical//med check          Recent Outpatient Visits              2 months ago Controlled type 2 diabetes mellitus with hypoglycemia, without long-term current use of insulin ([facility])    [address]    Office Visit    3 months ago Acute bacterial conjunctivitis of both eyes    [facility], [facility], [address] [place] [address] Olszewski, Kristen J, APRN    Office Visit    5 months ago Controlled type 2 diabetes mellitus without complication, without long-term current use of insulin ([facility])    [address] June Ley MD    Office Visit    1 year ago Routine general medical examination at a health care facility    [address]    Office Visit    1 year ago Controlled type 2 diabetes mellitus without complication, without long-term current use of insulin ([facility])    [address] June Ley MD    Office Visit

## 2024-06-18 ENCOUNTER — PATIENT MESSAGE (OUTPATIENT)
Dept: FAMILY MEDICINE CLINIC | Facility: CLINIC | Age: 59
End: 2024-06-18

## 2024-06-24 ENCOUNTER — LAB ENCOUNTER (OUTPATIENT)
Dept: LAB | Age: 59
End: 2024-06-24
Attending: FAMILY MEDICINE

## 2024-06-24 DIAGNOSIS — E11.9 CONTROLLED TYPE 2 DIABETES MELLITUS WITHOUT COMPLICATION, WITHOUT LONG-TERM CURRENT USE OF INSULIN (HCC): ICD-10-CM

## 2024-06-24 DIAGNOSIS — E78.00 HYPERCHOLESTEROLEMIA: ICD-10-CM

## 2024-06-24 DIAGNOSIS — Z13.29 SCREENING FOR THYROID DISORDER: ICD-10-CM

## 2024-06-24 DIAGNOSIS — Z13.0 SCREENING FOR DEFICIENCY ANEMIA: ICD-10-CM

## 2024-06-24 LAB
ALBUMIN SERPL-MCNC: 4.3 G/DL (ref 3.2–4.8)
ALBUMIN/GLOB SERPL: 1.8 {RATIO} (ref 1–2)
ALP LIVER SERPL-CCNC: 86 U/L
ALT SERPL-CCNC: 24 U/L
ANION GAP SERPL CALC-SCNC: 5 MMOL/L (ref 0–18)
AST SERPL-CCNC: 18 U/L (ref ?–34)
BASOPHILS # BLD AUTO: 0.03 X10(3) UL (ref 0–0.2)
BASOPHILS NFR BLD AUTO: 0.4 %
BILIRUB SERPL-MCNC: 0.7 MG/DL (ref 0.3–1.2)
BUN BLD-MCNC: 13 MG/DL (ref 9–23)
BUN/CREAT SERPL: 12.4 (ref 10–20)
CALCIUM BLD-MCNC: 9.4 MG/DL (ref 8.7–10.4)
CHLORIDE SERPL-SCNC: 108 MMOL/L (ref 98–112)
CHOLEST SERPL-MCNC: 151 MG/DL (ref ?–200)
CO2 SERPL-SCNC: 27 MMOL/L (ref 21–32)
CREAT BLD-MCNC: 1.05 MG/DL
CREAT UR-SCNC: 79.6 MG/DL
DEPRECATED RDW RBC AUTO: 41.4 FL (ref 35.1–46.3)
EGFRCR SERPLBLD CKD-EPI 2021: 83 ML/MIN/1.73M2 (ref 60–?)
EOSINOPHIL # BLD AUTO: 0.15 X10(3) UL (ref 0–0.7)
EOSINOPHIL NFR BLD AUTO: 2.1 %
ERYTHROCYTE [DISTWIDTH] IN BLOOD BY AUTOMATED COUNT: 12.6 % (ref 11–15)
EST. AVERAGE GLUCOSE BLD GHB EST-MCNC: 148 MG/DL (ref 68–126)
FASTING PATIENT LIPID ANSWER: YES
FASTING STATUS PATIENT QL REPORTED: YES
GLOBULIN PLAS-MCNC: 2.4 G/DL (ref 2–3.5)
GLUCOSE BLD-MCNC: 164 MG/DL (ref 70–99)
HBA1C MFR BLD: 6.8 % (ref ?–5.7)
HCT VFR BLD AUTO: 46 %
HDLC SERPL-MCNC: 46 MG/DL (ref 40–59)
HGB BLD-MCNC: 15.4 G/DL
IMM GRANULOCYTES # BLD AUTO: 0.02 X10(3) UL (ref 0–1)
IMM GRANULOCYTES NFR BLD: 0.3 %
LDLC SERPL CALC-MCNC: 86 MG/DL (ref ?–100)
LYMPHOCYTES # BLD AUTO: 2.44 X10(3) UL (ref 1–4)
LYMPHOCYTES NFR BLD AUTO: 34.6 %
MCH RBC QN AUTO: 30.1 PG (ref 26–34)
MCHC RBC AUTO-ENTMCNC: 33.5 G/DL (ref 31–37)
MCV RBC AUTO: 89.8 FL
MICROALBUMIN UR-MCNC: <0.3 MG/DL
MONOCYTES # BLD AUTO: 0.62 X10(3) UL (ref 0.1–1)
MONOCYTES NFR BLD AUTO: 8.8 %
NEUTROPHILS # BLD AUTO: 3.8 X10 (3) UL (ref 1.5–7.7)
NEUTROPHILS # BLD AUTO: 3.8 X10(3) UL (ref 1.5–7.7)
NEUTROPHILS NFR BLD AUTO: 53.8 %
NONHDLC SERPL-MCNC: 105 MG/DL (ref ?–130)
OSMOLALITY SERPL CALC.SUM OF ELEC: 294 MOSM/KG (ref 275–295)
PLATELET # BLD AUTO: 231 10(3)UL (ref 150–450)
POTASSIUM SERPL-SCNC: 4.2 MMOL/L (ref 3.5–5.1)
PROT SERPL-MCNC: 6.7 G/DL (ref 5.7–8.2)
RBC # BLD AUTO: 5.12 X10(6)UL
SODIUM SERPL-SCNC: 140 MMOL/L (ref 136–145)
TRIGL SERPL-MCNC: 101 MG/DL (ref 30–149)
TSI SER-ACNC: 1.75 MIU/ML (ref 0.55–4.78)
VLDLC SERPL CALC-MCNC: 16 MG/DL (ref 0–30)
WBC # BLD AUTO: 7.1 X10(3) UL (ref 4–11)

## 2024-06-24 PROCEDURE — 80053 COMPREHEN METABOLIC PANEL: CPT

## 2024-06-24 PROCEDURE — 85025 COMPLETE CBC W/AUTO DIFF WBC: CPT

## 2024-06-24 PROCEDURE — 82043 UR ALBUMIN QUANTITATIVE: CPT

## 2024-06-24 PROCEDURE — 84443 ASSAY THYROID STIM HORMONE: CPT

## 2024-06-24 PROCEDURE — 80061 LIPID PANEL: CPT

## 2024-06-24 PROCEDURE — 82570 ASSAY OF URINE CREATININE: CPT

## 2024-06-24 PROCEDURE — 83036 HEMOGLOBIN GLYCOSYLATED A1C: CPT

## 2024-06-24 PROCEDURE — 36415 COLL VENOUS BLD VENIPUNCTURE: CPT

## 2024-07-12 ENCOUNTER — OFFICE VISIT (OUTPATIENT)
Dept: FAMILY MEDICINE CLINIC | Facility: CLINIC | Age: 59
End: 2024-07-12
Payer: COMMERCIAL

## 2024-07-12 VITALS
HEIGHT: 68.5 IN | HEART RATE: 83 BPM | DIASTOLIC BLOOD PRESSURE: 86 MMHG | SYSTOLIC BLOOD PRESSURE: 126 MMHG | RESPIRATION RATE: 16 BRPM | WEIGHT: 222.5 LBS | OXYGEN SATURATION: 98 % | BODY MASS INDEX: 33.33 KG/M2 | TEMPERATURE: 98 F

## 2024-07-12 DIAGNOSIS — E11.9 CONTROLLED TYPE 2 DIABETES MELLITUS WITHOUT COMPLICATION, WITHOUT LONG-TERM CURRENT USE OF INSULIN (HCC): ICD-10-CM

## 2024-07-12 DIAGNOSIS — Z12.5 SCREENING FOR PROSTATE CANCER: ICD-10-CM

## 2024-07-12 DIAGNOSIS — K21.00 GASTROESOPHAGEAL REFLUX DISEASE WITH ESOPHAGITIS, UNSPECIFIED WHETHER HEMORRHAGE: ICD-10-CM

## 2024-07-12 DIAGNOSIS — Z00.00 ROUTINE GENERAL MEDICAL EXAMINATION AT A HEALTH CARE FACILITY: Primary | ICD-10-CM

## 2024-07-12 DIAGNOSIS — E78.00 HYPERCHOLESTEROLEMIA: ICD-10-CM

## 2024-07-12 DIAGNOSIS — I10 ESSENTIAL HYPERTENSION: ICD-10-CM

## 2024-07-12 PROCEDURE — 99214 OFFICE O/P EST MOD 30 MIN: CPT | Performed by: FAMILY MEDICINE

## 2024-07-12 PROCEDURE — 99396 PREV VISIT EST AGE 40-64: CPT | Performed by: FAMILY MEDICINE

## 2024-07-12 RX ORDER — METFORMIN HYDROCHLORIDE 500 MG/1
1000 TABLET, EXTENDED RELEASE ORAL 2 TIMES DAILY WITH MEALS
Qty: 360 TABLET | Refills: 1 | Status: SHIPPED | OUTPATIENT
Start: 2024-07-12

## 2024-07-12 RX ORDER — PANTOPRAZOLE SODIUM 40 MG/1
TABLET, DELAYED RELEASE ORAL
Qty: 90 TABLET | Refills: 1 | Status: SHIPPED | OUTPATIENT
Start: 2024-07-12

## 2024-07-12 RX ORDER — DULAGLUTIDE 0.75 MG/.5ML
0.75 INJECTION, SOLUTION SUBCUTANEOUS WEEKLY
Qty: 12 EACH | Refills: 1 | Status: SHIPPED | OUTPATIENT
Start: 2024-07-12

## 2024-07-12 RX ORDER — ATORVASTATIN CALCIUM 40 MG/1
40 TABLET, FILM COATED ORAL NIGHTLY
Qty: 90 TABLET | Refills: 1 | Status: SHIPPED | OUTPATIENT
Start: 2024-07-12

## 2024-07-12 RX ORDER — LOSARTAN POTASSIUM 50 MG/1
50 TABLET ORAL DAILY
Qty: 90 TABLET | Refills: 1 | Status: SHIPPED | OUTPATIENT
Start: 2024-07-12

## 2024-07-12 NOTE — PROGRESS NOTES
Chris Timmons is a 59 year old male.  Chief Complaint   Patient presents with    Physical    Diabetes     HPI:   Chris Timmons is a 59 year old male with history of diabetes, hypertension, GERD seen for his annual physical.  Colonoscopy done in march, benign polyps, repeat colonoscopy in 7 years. Denies constipation or blood in stool.  Denies nocturia, no family history of prostrate ca.    States has been compliant with his diabetes medication, poorly compliant with diet and exercise as his job changed 4 months ago and also has a lot of stress.  Tolerating medication well without any side effects.  Due to schedule his eye exam.  Last Diabetic Eye Exam:  Last Dilated Eye Exam: 07/15/23  Eye Exam shows Diabetic Retinopathy?: No    Compliant with his blood pressure medication and low-salt diet, tolerating medication well without any side effects.    Patient states due to his family history of stomach cancer will be getting an EGD in fall or winter with Dr. Greene.    PAST MEDICAL HISTORY:     Past Medical History:    Arthritis    Diabetes mellitus (HCC)    Flatulence/gas pain/belching    Heartburn    Osteoarthritis    left shoulder    Type II or unspecified type diabetes mellitus without mention of complication, not stated as uncontrolled    Visual impairment    reading glasses     PAST SURGICAL HISTORY:     Past Surgical History:   Procedure Laterality Date    Colonoscopy  2017    Reconstr total shoulder implant Left 2/17/17    Vasectomy  2004     ALLERGY:   No Known Allergies  MEDICATIONS:     Current Outpatient Medications   Medication Sig Dispense Refill    Dulaglutide (TRULICITY) 0.75 MG/0.5ML Subcutaneous Solution Pen-injector Inject 0.75 mg into the skin once a week. 2 mL 0    METFORMIN  MG Oral Tablet 24 Hr TAKE 2 TABLETS(1000 MG) BY MOUTH IN THE MORNING AND IN THE EVENING WITH MEALS 360 tablet 0    pantoprazole 40 MG Oral Tab EC TAKE 1 TABLET(40 MG) BY MOUTH EVERY DAY 30 MINUTES BEFORE BREAKFAST 90  tablet 1    atorvastatin 40 MG Oral Tab Take 1 tablet (40 mg total) by mouth nightly. 90 tablet 1    losartan 50 MG Oral Tab Take 1 tablet (50 mg total) by mouth daily. 90 tablet 1    Dulaglutide (TRULICITY) 1.5 MG/0.5ML Subcutaneous Solution Pen-injector Inject 1.5 mg into the skin once a week. (Patient not taking: Reported on 7/12/2024) 12 mL 1     FAMILY HISTORY:     Family History   Problem Relation Age of Onset    Diabetes Father     Hypertension Father     Cancer Father 67        stomach    Colon Polyps Father     Alcohol and Other Disorders Associated Father     Hypertension Mother     Hypertension Maternal Grandmother     Diabetes Maternal Grandmother     Cancer Maternal Grandfather         Lung    Cancer Paternal Grandmother         Brain    Other (Other) Sister         benign jaw tumor in muscle, has same other side    Diabetes Self      SOCIAL HISTORY:     Social History     Socioeconomic History    Marital status:     Number of children: 2   Occupational History    Occupation: Service (customer service)     Comment: [employer]    Tobacco Use    Smoking status: Never     Passive exposure: Never    Smokeless tobacco: Never   Vaping Use    Vaping status: Never Used   Substance and Sexual Activity    Alcohol use: Yes     Alcohol/week: 1.0 - 2.0 standard drink of alcohol     Types: 1 - 2 Standard drinks or equivalent per week     Comment: 1 drink per week    Drug use: No    Sexual activity: Yes     Partners: Female   Other Topics Concern     Service No    Blood Transfusions No    Caffeine Concern Yes    Occupational Exposure No    Hobby Hazards No    Sleep Concern Yes     Comment: up at least once/night, sometimes x 2, hard to get back asleep    Stress Concern No    Weight Concern Yes    Special Diet Yes    Exercise Yes    Seat Belt Yes   Social History Narrative    Children 19 (daughter, , [employer] in [place]) & 16 (son). ([employer]). No pets.     REVIEW OF  SYSTEMS:   Review of Systems   Constitutional:  Negative for appetite change, fatigue, fever and unexpected weight change.   HENT:  Negative for congestion, ear pain, hearing loss and sore throat.    Eyes:  Negative for discharge, redness and visual disturbance.   Respiratory:  Negative for cough, chest tightness and shortness of breath.    Cardiovascular:  Negative for chest pain and palpitations.   Gastrointestinal:  Negative for abdominal pain, blood in stool, constipation and nausea.   Endocrine: Negative for cold intolerance, heat intolerance and polyuria.   Genitourinary:  Negative for difficulty urinating, dysuria, frequency and urgency.   Musculoskeletal:  Negative for arthralgias, gait problem, joint swelling and myalgias.   Skin:  Negative for rash.   Allergic/Immunologic: Negative for food allergies.   Neurological:  Negative for dizziness, weakness, numbness and headaches.   Hematological:  Negative for adenopathy. Does not bruise/bleed easily.   Psychiatric/Behavioral:  Negative for dysphoric mood and sleep disturbance. The patient is not nervous/anxious.         PHYSICAL EXAM:   /86   Pulse 83   Temp 97.9 °F (36.6 °C) (Temporal)   Resp 16   Ht 5' 8.5\" (1.74 m)   Wt 222 lb 8 oz (100.9 kg)   SpO2 98%   BMI 33.34 kg/m²     Physical Exam  Constitutional:       General: He is not in acute distress.     Appearance: Normal appearance. He is well-developed. He is obese.   HENT:      Head: Normocephalic and atraumatic.      Right Ear: Tympanic membrane, ear canal and external ear normal.      Left Ear: Tympanic membrane, ear canal and external ear normal.      Nose: Nose normal.      Mouth/Throat:      Mouth: Mucous membranes are moist.      Pharynx: Oropharynx is clear.   Eyes:      Extraocular Movements: Extraocular movements intact.      Conjunctiva/sclera: Conjunctivae normal.      Pupils: Pupils are equal, round, and reactive to light.   Neck:      Thyroid: No thyromegaly.      Vascular: No  carotid bruit.   Cardiovascular:      Rate and Rhythm: Normal rate and regular rhythm.      Pulses: Normal pulses.      Heart sounds: Normal heart sounds. No murmur heard.  Pulmonary:      Effort: Pulmonary effort is normal. No respiratory distress.      Breath sounds: Normal breath sounds.   Chest:      Chest wall: No tenderness.   Abdominal:      General: Bowel sounds are normal. There is no distension.      Palpations: Abdomen is soft. There is no hepatomegaly, splenomegaly or mass.      Tenderness: There is no abdominal tenderness.      Hernia: No hernia is present.   Musculoskeletal:         General: Normal range of motion.      Cervical back: Normal range of motion and neck supple.      Right lower leg: No edema.      Left lower leg: No edema.   Lymphadenopathy:      Cervical: No cervical adenopathy.   Skin:     General: Skin is warm.      Findings: No rash.   Neurological:      General: No focal deficit present.      Mental Status: He is alert and oriented to person, place, and time.      Cranial Nerves: No cranial nerve deficit.      Sensory: No sensory deficit.      Motor: No weakness.      Coordination: Coordination normal.      Gait: Gait normal.      Deep Tendon Reflexes: Reflexes are normal and symmetric. Reflexes normal.   Psychiatric:         Mood and Affect: Mood normal.         Behavior: Behavior normal.         Thought Content: Thought content normal.         Judgment: Judgment normal.        LABS AND IMAGING:     Component      Latest Ref [facility] 6/24/2024   WBC      4.0 - 11.0 x10(3) uL 7.1    RBC      4.30 - 5.70 x10(6)uL 5.12    Hemoglobin      13.0 - 17.5 g/dL 15.4    Hematocrit      39.0 - 53.0 % 46.0    MCV      80.0 - 100.0 fL 89.8    MCH      26.0 - 34.0 pg 30.1    MCHC      31.0 - 37.0 g/dL 33.5    RDW-SD      35.1 - 46.3 fL 41.4    RDW      11.0 - 15.0 % 12.6    Platelet Count      150.0 - 450.0 10(3)uL 231.0    Prelim Neutrophil Abs      1.50 - 7.70 x10 (3) uL 3.80    Neutrophils Absolute       1.50 - 7.70 x10(3) uL 3.80    Lymphocytes Absolute      1.00 - 4.00 x10(3) uL 2.44    Monocytes Absolute      0.10 - 1.00 x10(3) uL 0.62    Eosinophils Absolute      0.00 - 0.70 x10(3) uL 0.15    Basophils Absolute      0.00 - 0.20 x10(3) uL 0.03    Immature Granulocyte Absolute      0.00 - 1.00 x10(3) uL 0.02    Neutrophils %      % 53.8    Lymphocytes %      % 34.6    Monocytes %      % 8.8    Eosinophils %      % 2.1    Basophils %      % 0.4    Immature Granulocyte %      % 0.3    Glucose      70 - 99 mg/dL 164 (H)    Sodium      136 - 145 mmol/L 140    Potassium      3.5 - 5.1 mmol/L 4.2    Chloride      98 - 112 mmol/L 108    Carbon Dioxide, Total      21.0 - 32.0 mmol/L 27.0    ANION GAP      0 - 18 mmol/L 5    BUN      9 - 23 mg/dL 13    CREATININE      0.70 - 1.30 mg/dL 1.05    BUN/CREATININE RATIO      10.0 - 20.0  12.4    CALCIUM      8.7 - 10.4 mg/dL 9.4    CALCULATED OSMOLALITY      275 - 295 mOsm/kg 294    EGFR      >=60 mL/min/1.73m2 83    ALT (SGPT)      10 - 49 U/L 24    AST (SGOT)      <=34 U/L 18    ALKALINE PHOSPHATASE      45 - 117 U/L 86    Total Bilirubin      0.3 - 1.2 mg/dL 0.7    PROTEIN, TOTAL      5.7 - 8.2 g/dL 6.7    Albumin      3.2 - 4.8 g/dL 4.3    Globulin      2.0 - 3.5 g/dL 2.4    A/G Ratio      1.0 - 2.0  1.8    Patient Fasting for CMP? Yes    Cholesterol, Total      <200 mg/dL 151    HDL Cholesterol      40 - 59 mg/dL 46    Triglycerides      30 - 149 mg/dL 101    LDL Cholesterol Calc      <100 mg/dL 86    VLDL      0 - 30 mg/dL 16    NON-HDL CHOLESTEROL      <130 mg/dL 105    Patient Fasting for Lipid? Yes    MALB URINE      mg/dL <0.30    CREATININE UR RANDOM      mg/dL 79.60    MALB/CRE CALC --    HEMOGLOBIN A1c      <5.7 % 6.8 (H)    ESTIMATED AVERAGE GLUCOSE      68 - 126 mg/dL 148 (H)    TSH      0.550 - 4.780 mIU/mL 1.752      ASSESSMENT AND PLAN:   Chris was seen today for physical and diabetes.    Diagnoses and all orders for this visit:    Routine general medical  examination at a health care facility    Hypercholesterolemia well controlled  -     atorvastatin 40 MG Oral Tab; Take 1 tablet (40 mg total) by mouth nightly.  -     continue the same dose of medication  -     limit saturated fats and cholesterol in diet.    Essential hypertension controlled  -     losartan 50 MG Oral Tab; Take 1 tablet (50 mg total) by mouth daily.  -     continue the same dose of medication  -     limit salt to less than 1000 mg a day  -     goal BP is less than 130/80    Controlled type 2 diabetes mellitus without complication, without long-term current use of insulin (HCC)  -     metFORMIN  MG Oral Tablet 24 Hr; Take 2 tablets (1,000 mg total) by mouth 2 (two) times daily with meals.  -     Dulaglutide (TRULICITY) 0.75 MG/0.5ML Subcutaneous Solution Pen-injector; Inject 0.75 mg into the skin once a week.  -     Hemoglobin A1C; Future  -     Comp Metabolic Panel (14); Future  -     HgbA1c 6.8, went up from 6.1  -     encouraged to continue to limit refined sugars and carbs in diet.    Gastroesophageal reflux disease with esophagitis, unspecified whether hemorrhage controlled  -     pantoprazole 40 MG Oral Tab EC; TAKE 1 TABLET(40 MG) BY MOUTH EVERY DAY 30 MINUTES BEFORE BREAKFAST    Screening for prostate cancer  -     PSA, Total (Screening) [E]; Future    Age appropriate anticipatory guidance reviewed  Health Maintenance   Topic Date Due    Zoster Vaccines (1 of 2) Never done    COVID-19 Vaccine (4 - 2023-24 season) 09/01/2023    Annual Physical  Done today    Diabetes Care Dilated Eye Exam  07/15/2024    Influenza Vaccine (1) 10/01/2024    Diabetes Care A1C  12/24/2024    Diabetes Care Foot Exam  01/05/2025    PSA  02/24/2025    Diabetes Care: GFR  06/24/2025    Diabetes Care: Microalb/Creat Ratio  06/24/2025    Colorectal Cancer Screening  03/04/2031    DTaP,Tdap,and Td Vaccines (2 - Td or Tdap) 03/03/2033    Annual Depression Screening  Completed    Pneumococcal Vaccine: Birth to 64yrs   Completed      Return in about 6 months (around 1/12/2025) for Diabetes f/u, HTN f/u, hyperlipidemia.    The 21st Century Cures Act makes medical notes like these available to patients in the interest of transparency. Please be advised this is a medical document. Medical documents are intended to carry relevant information, facts as evident, and the clinical opinion of the practitioner. The medical note is intended as peer to peer communication and may appear blunt or direct. It is written in medical language and may contain abbreviations or verbiage that are unfamiliar.

## 2024-07-19 ENCOUNTER — TELEPHONE (OUTPATIENT)
Dept: INTERNAL MEDICINE CLINIC | Facility: CLINIC | Age: 59
End: 2024-07-19

## 2024-07-19 ENCOUNTER — TELEPHONE (OUTPATIENT)
Dept: FAMILY MEDICINE CLINIC | Facility: CLINIC | Age: 59
End: 2024-07-19

## 2024-07-19 NOTE — TELEPHONE ENCOUNTER
Patient called stating pharmacy did not receive Trulicity refill, states they need prior auth.  Advised patient it appears prior auth was approved just a few hours ago, pharmacy should receive this information and patient can  rx when ready.  He confirms understanding.

## 2025-01-10 DIAGNOSIS — E11.9 CONTROLLED TYPE 2 DIABETES MELLITUS WITHOUT COMPLICATION, WITHOUT LONG-TERM CURRENT USE OF INSULIN (HCC): ICD-10-CM

## 2025-01-10 DIAGNOSIS — I10 ESSENTIAL HYPERTENSION: ICD-10-CM

## 2025-01-14 RX ORDER — LOSARTAN POTASSIUM 50 MG/1
50 TABLET ORAL DAILY
Qty: 90 TABLET | Refills: 3 | Status: SHIPPED | OUTPATIENT
Start: 2025-01-14

## 2025-01-14 NOTE — TELEPHONE ENCOUNTER
Refill passed per [facility] protocol.  Requested Prescriptions   Pending Prescriptions Disp Refills    LOSARTAN 50 MG Oral Tab [Pharmacy Med Name: LOSARTAN 50MG TABLETS] 90 tablet 1     Sig: TAKE 1 TABLET(50 MG) BY MOUTH DAILY       Hypertension Medications Protocol Passed - 1/14/2025  4:00 PM        Passed - CMP or BMP in past 12 months        Passed - Last BP reading less than 140/90     BP Readings from Last 1 Encounters:   07/12/24 126/86               Passed - In person appointment or virtual visit in the past 12 mos or appointment in next 3 mos     Recent Outpatient Visits              6 months ago Routine general medical examination at a health care facility    [address] June Ley MD    Office Visit    10 months ago Controlled type 2 diabetes mellitus with hypoglycemia, without long-term current use of insulin ([facility])    [address] June Ley MD    Office Visit    10 months ago Acute bacterial conjunctivitis of both eyes    [facility], [facility]In [facility], [address] [place] [address] Olszewski, Kristen J, APRN    Office Visit    1 year ago Controlled type 2 diabetes mellitus without complication, without long-term current use of insulin ([facility])    [address]    Office Visit    1 year ago Routine general medical examination at a health care facility    [address] June Ley MD    Office Visit          Future Appointments         Provider Department Appt Notes    In 2 days [facility]OS[facility], [address] ORDERS IN [facility] 7/12/24    In 3 days June Garland MD [address] 6 mo f/u                    Passed - EGFRCR or GFRNAA > 50     GFR Evaluation  EGFRCR: 83 , resulted on 6/24/2024          Passed -  Medication is active on med list          METFORMIN  MG Oral Tablet 24 Hr [Pharmacy Med Name: METFORMIN ER 500MG 24HR TABS] 360 tablet 1     Sig: TAKE 2 TABLETS(1000 MG) BY MOUTH TWICE DAILY WITH MEALS       Diabetes Medication Protocol Failed - 1/14/2025  4:00 PM        Failed - Last A1C < 7.5 and within past 6 months     Lab Results   Component Value Date    A1C 6.8 (H) 06/24/2024             Passed - In person appointment or virtual visit in the past 6 mos or appointment in next 3 mos     Recent Outpatient Visits              6 months ago Routine general medical examination at a health care facility    [address] June Ley MD    Office Visit    10 months ago Controlled type 2 diabetes mellitus with hypoglycemia, without long-term current use of insulin ([facility])    [address] June Ley MD    Office Visit    10 months ago Acute bacterial conjunctivitis of both eyes    [facility], Walk-In Clinic, [address] [place] [address] Olszewski, Kristen J, APRN    Office Visit    1 year ago Controlled type 2 diabetes mellitus without complication, without long-term current use of insulin ([facility])    [address] June Ley MD    Office Visit    1 year ago Routine general medical examination at a health care facility    [address] June Ley MD    Office Visit          Future Appointments         Provider Department Appt Notes    In 2 days [facility] [address], [address] ORDERS IN [facility] 7/12/24    In 3 days June Garland MD [address] 6 mo f/u                    Passed - Microalbumin procedure in past 12 months or taking ACE/ARB        Passed - EGFRCR or GFRNAA > 50     GFR Evaluation  EGFRCR: 83 , resulted on 6/24/2024           Passed - GFR in the past 12 months        Passed - Medication is active on med list           Future Appointments         Provider Department Appt Notes    In 2 days [facility], [address], [address] ORDERS IN [facility] 7/12/24    In 3 days June Garland MD [facility], [address] [place] [address] 6 mo f/u          Recent Outpatient Visits              6 months ago Routine general medical examination at a health care facility    [facility], [address] [place], [place] [address]June Smyth MD    Office Visit    10 months ago Controlled type 2 diabetes mellitus with hypoglycemia, without long-term current use of insulin ([facility])    [address]    Office Visit    10 months ago Acute bacterial conjunctivitis of both eyes    [facility], Walk-In Clinic, [address] [place] [address] Olszewski, Kristen J, APRN    Office Visit    1 year ago Controlled type 2 diabetes mellitus without complication, without long-term current use of insulin ([facility])    [facility], [address] [place], [place] [address]June Smyth MD    Office Visit    1 year ago Routine general medical examination at a health care facility    [address] Jnue Ley MD    Office Visit

## 2025-01-14 NOTE — TELEPHONE ENCOUNTER
Please review; protocol failed/No Protocol    Sent Motiga message to patient to complete labs from 07/12/2024 office visit.     Requested Prescriptions   Pending Prescriptions Disp Refills    metFORMIN  MG Oral Tablet 24 Hr [Pharmacy Med Name: METFORMIN ER 500MG 24HR TABS] 360 tablet 1     Sig: Take 2 tablets (1,000 mg total) by mouth 2 (two) times daily with meals.       Diabetes Medication Protocol Failed - 1/14/2025  4:00 PM        Failed - Last A1C < 7.5 and within past 6 months     Lab Results   Component Value Date    A1C 6.8 (H) 06/24/2024             Passed - In person appointment or virtual visit in the past 6 mos or appointment in next 3 mos     Recent Outpatient Visits              6 months ago Routine general medical examination at a health care facility    [address]    Office Visit    10 months ago Controlled type 2 diabetes mellitus with hypoglycemia, without long-term current use of insulin ([facility])    [address]    Office Visit    10 months ago Acute bacterial conjunctivitis of both eyes    [facility], [facility], [address] [place] [address] Olszewski, Kristen J, APRN    Office Visit    1 year ago Controlled type 2 diabetes mellitus without complication, without long-term current use of insulin ([facility])    [address]    Office Visit    1 year ago Routine general medical examination at a health care facility    [address]    Office Visit          Future Appointments         Provider Department Appt Notes    In 2 days [facility], Radha Gil, [place] ORDERS IN EPIC 7/12/24    In 3 days June Garland MD [address] 6 mo f/u                     Writer talked to Palliative care regarding a GOC consult for 3/6/19.  Writer went into the room and patients son Jose had already left.  Contacted Jose by phone [phone] he is not able to come until ~12:00 tomorrow.  Writer requested an  from 13:00-15:00 and  services to contact agency.  Writer called Jose regarding the 13:00 appointment and he will be bringing his brother.  Jcr to update Shirin with Palliative.  Will follow up tomorrow regarding confirmation of  to arrive.   Passed - Microalbumin procedure in past 12 months or taking ACE/ARB        Passed - EGFRCR or GFRNAA > 50     GFR Evaluation  EGFRCR: 83 , resulted on 6/24/2024          Passed - GFR in the past 12 months        Passed - Medication is active on med list         Signed Prescriptions Disp Refills    losartan 50 MG Oral Tab 90 tablet 3     Sig: Take 1 tablet (50 mg total) by mouth daily.       Hypertension Medications Protocol Passed - 1/14/2025  4:00 PM        Passed - CMP or BMP in past 12 months        Passed - Last BP reading less than 140/90     BP Readings from Last 1 Encounters:   07/12/24 126/86               Passed - In person appointment or virtual visit in the past 12 mos or appointment in next 3 mos     Recent Outpatient Visits              6 months ago Routine general medical examination at a health care facility    [facility], [address] [place] [address] June Ley MD    Office Visit    10 months ago Controlled type 2 diabetes mellitus with hypoglycemia, without long-term current use of insulin ([facility])    [address] June Ley MD    Office Visit    10 months ago Acute bacterial conjunctivitis of both eyes    [facility], [facility], [address] [place] [address] [place] Olszewski, Kristen J, APRN    Office Visit    1 year ago Controlled type 2 diabetes mellitus without complication, without long-term current use of insulin ([facility])    [address] June Ley MD    Office Visit    1 year ago Routine general medical examination at a health care facility    [address] June Ley MD    Office Visit          Future Appointments         Provider Department Appt Notes    In 2 days [facility], Radha Gil, [place] ORDERS IN EPIC 7/12/24    In 3 days June Garland MD [facility],  [address] [place] [address] 6 mo f/u                    Passed - EGFRCR or GFRNAA > 50     GFR Evaluation  EGFRCR: 83 , resulted on 6/24/2024          Passed - Medication is active on med list           Future Appointments         Provider Department Appt Notes    In 2 days [facility], Radha Gil, Cee ORDERS IN EPIC 7/12/24    In 3 days June Garland MD [address] 6 mo f/u          Recent Outpatient Visits              6 months ago Routine general medical examination at a health care facility    [address] June Ley MD    Office Visit    10 months ago Controlled type 2 diabetes mellitus with hypoglycemia, without long-term current use of insulin ([facility])    [address] MD    Office Visit    10 months ago Acute bacterial conjunctivitis of both eyes    [facility], [facility], [address] [place], [place] [address] Olszewski, Kristen J, HUNG    Office Visit    1 year ago Controlled type 2 diabetes mellitus without complication, without long-term current use of insulin ([facility])    [address] June Ley MD    Office Visit    1 year ago Routine general medical examination at a health care facility    [address], MD    Office Visit

## 2025-01-15 RX ORDER — METFORMIN HYDROCHLORIDE 500 MG/1
1000 TABLET, EXTENDED RELEASE ORAL 2 TIMES DAILY WITH MEALS
Qty: 360 TABLET | Refills: 0 | Status: SHIPPED | OUTPATIENT
Start: 2025-01-15 | End: 2025-01-17

## 2025-01-16 ENCOUNTER — LAB ENCOUNTER (OUTPATIENT)
Dept: LAB | Age: 60
End: 2025-01-16
Attending: FAMILY MEDICINE
Payer: COMMERCIAL

## 2025-01-16 DIAGNOSIS — Z12.5 SCREENING FOR PROSTATE CANCER: ICD-10-CM

## 2025-01-16 DIAGNOSIS — E11.9 CONTROLLED TYPE 2 DIABETES MELLITUS WITHOUT COMPLICATION, WITHOUT LONG-TERM CURRENT USE OF INSULIN (HCC): ICD-10-CM

## 2025-01-16 LAB
ALBUMIN SERPL-MCNC: 4.5 G/DL (ref 3.2–4.8)
ALBUMIN/GLOB SERPL: 1.7 {RATIO} (ref 1–2)
ALP LIVER SERPL-CCNC: 79 U/L
ALT SERPL-CCNC: 18 U/L
ANION GAP SERPL CALC-SCNC: 6 MMOL/L (ref 0–18)
AST SERPL-CCNC: 19 U/L (ref ?–34)
BILIRUB SERPL-MCNC: 0.6 MG/DL (ref 0.3–1.2)
BUN BLD-MCNC: 15 MG/DL (ref 9–23)
CALCIUM BLD-MCNC: 9.9 MG/DL (ref 8.7–10.6)
CHLORIDE SERPL-SCNC: 107 MMOL/L (ref 98–112)
CO2 SERPL-SCNC: 28 MMOL/L (ref 21–32)
COMPLEXED PSA SERPL-MCNC: 0.57 NG/ML (ref ?–4)
CREAT BLD-MCNC: 1.06 MG/DL
EGFRCR SERPLBLD CKD-EPI 2021: 82 ML/MIN/1.73M2 (ref 60–?)
EST. AVERAGE GLUCOSE BLD GHB EST-MCNC: 171 MG/DL (ref 68–126)
FASTING STATUS PATIENT QL REPORTED: NO
GLOBULIN PLAS-MCNC: 2.6 G/DL (ref 2–3.5)
GLUCOSE BLD-MCNC: 151 MG/DL (ref 70–99)
HBA1C MFR BLD: 7.6 % (ref ?–5.7)
OSMOLALITY SERPL CALC.SUM OF ELEC: 296 MOSM/KG (ref 275–295)
POTASSIUM SERPL-SCNC: 4.5 MMOL/L (ref 3.5–5.1)
PROT SERPL-MCNC: 7.1 G/DL (ref 5.7–8.2)
SODIUM SERPL-SCNC: 141 MMOL/L (ref 136–145)

## 2025-01-16 PROCEDURE — 36415 COLL VENOUS BLD VENIPUNCTURE: CPT

## 2025-01-16 PROCEDURE — 80053 COMPREHEN METABOLIC PANEL: CPT

## 2025-01-16 PROCEDURE — 83036 HEMOGLOBIN GLYCOSYLATED A1C: CPT

## 2025-01-17 ENCOUNTER — OFFICE VISIT (OUTPATIENT)
Dept: FAMILY MEDICINE CLINIC | Facility: CLINIC | Age: 60
End: 2025-01-17
Payer: COMMERCIAL

## 2025-01-17 VITALS
SYSTOLIC BLOOD PRESSURE: 130 MMHG | DIASTOLIC BLOOD PRESSURE: 80 MMHG | HEIGHT: 68.5 IN | WEIGHT: 220 LBS | RESPIRATION RATE: 18 BRPM | OXYGEN SATURATION: 98 % | BODY MASS INDEX: 32.96 KG/M2 | TEMPERATURE: 98 F | HEART RATE: 76 BPM

## 2025-01-17 DIAGNOSIS — E66.811 CLASS 1 OBESITY DUE TO EXCESS CALORIES WITH SERIOUS COMORBIDITY AND BODY MASS INDEX (BMI) OF 32.0 TO 32.9 IN ADULT: ICD-10-CM

## 2025-01-17 DIAGNOSIS — K21.00 GASTROESOPHAGEAL REFLUX DISEASE WITH ESOPHAGITIS, UNSPECIFIED WHETHER HEMORRHAGE: ICD-10-CM

## 2025-01-17 DIAGNOSIS — E66.09 CLASS 1 OBESITY DUE TO EXCESS CALORIES WITH SERIOUS COMORBIDITY AND BODY MASS INDEX (BMI) OF 32.0 TO 32.9 IN ADULT: ICD-10-CM

## 2025-01-17 DIAGNOSIS — E11.65 UNCONTROLLED TYPE 2 DIABETES MELLITUS WITH HYPERGLYCEMIA, WITHOUT LONG-TERM CURRENT USE OF INSULIN (HCC): Primary | ICD-10-CM

## 2025-01-17 DIAGNOSIS — E78.00 HYPERCHOLESTEROLEMIA: ICD-10-CM

## 2025-01-17 PROCEDURE — 99214 OFFICE O/P EST MOD 30 MIN: CPT | Performed by: FAMILY MEDICINE

## 2025-01-17 RX ORDER — DULAGLUTIDE 1.5 MG/.5ML
1.5 INJECTION, SOLUTION SUBCUTANEOUS WEEKLY
Qty: 6 ML | Refills: 0 | Status: SHIPPED | OUTPATIENT
Start: 2025-01-17 | End: 2025-04-17

## 2025-01-17 RX ORDER — METFORMIN HYDROCHLORIDE 500 MG/1
1000 TABLET, EXTENDED RELEASE ORAL 2 TIMES DAILY WITH MEALS
Qty: 360 TABLET | Refills: 0 | Status: SHIPPED | OUTPATIENT
Start: 2025-01-17

## 2025-01-17 RX ORDER — PANTOPRAZOLE SODIUM 40 MG/1
TABLET, DELAYED RELEASE ORAL
Qty: 90 TABLET | Refills: 1 | Status: SHIPPED | OUTPATIENT
Start: 2025-01-17

## 2025-01-17 RX ORDER — ATORVASTATIN CALCIUM 40 MG/1
40 TABLET, FILM COATED ORAL NIGHTLY
Qty: 90 TABLET | Refills: 1 | Status: SHIPPED | OUTPATIENT
Start: 2025-01-17

## 2025-01-17 NOTE — PROGRESS NOTES
Family Medicine Progress Note  ASSESSMENT AND PLAN:  Chris Timmons is a 59 year old male who is here for:     Chris was seen today for diabetes.    Diagnoses and all orders for this visit:    Uncontrolled type 2 diabetes mellitus with hyperglycemia, without long-term current use of insulin (HCC)  -     Dulaglutide (TRULICITY) 1.5 MG/0.5ML Subcutaneous Solution Auto-injector; Inject 1.5 mg into the skin once a week.  -     metFORMIN  MG Oral Tablet 24 Hr; Take 2 tablets (1,000 mg total) by mouth 2 (two) times daily with meals.  -     Hemoglobin A1C; Future  -     Microalb/Creat Ratio, Random Urine; Future  -     Comp Metabolic Panel (14); Future  -     Hgb A1c has increased from 6.8 to 7.6  -     Dose of Trulicity increased to 1.5 mg  -     continue the same dose of Metformin  -     continue to limit refined sugars and carbs in diet and increase physical activity    Hypercholesterolemia controlled  -     atorvastatin 40 MG Oral Tab; Take 1 tablet (40 mg total) by mouth nightly.  -     Lipid Panel; Future  -     continue the same dose of medication  -     limit saturated fats and cholesterol in diet.    Gastroesophageal reflux disease with esophagitis, unspecified whether hemorrhage  -     pantoprazole 40 MG Oral Tab EC; TAKE 1 TABLET(40 MG) BY MOUTH EVERY DAY 30 MINUTES BEFORE BREAKFAST  -     continue the same dose of medication    Class 1 obesity due to excess calories with serious comorbidity and body mass index (BMI) of 32.0 to 32.9 in adult         - encouraged to increase physical activity and limit his calorie intake.         The patient indicates understanding of these issues and agrees to the plan.  Follow-Up: The patient is asked to return in Return in about 3 months (around 4/17/2025) for Diabetes f/u.  .     June Garland MD   01/17/25      CC: Diabetes    HPI:   Chris Timmnos is a 59 year old male with history of diabetes, hypertension and hyperlipidemia seen for follow-up and  medication refill.    Patient is concerned that his A1c has gone up, states has been compliant with diet and taking his medications but has not been as physically active because of the cold weather.  Denies increased urination or thirst.  States fasting sugars have been higher in the 150s over the past couple of months.  Would like to discuss adjusting the dose of his medications.  Last Diabetic Eye Exam:  Last Dilated Eye Exam: 08/06/24  Eye Exam shows Diabetic Retinopathy?: No    Compliant with his blood pressure medication and low-salt diet, tolerating it well without any side effects.    Compliant with his cholesterol medication and diet.    GERD symptoms are well-controlled with pantoprazole, denies any abdominal pain or heartburn.    ALLERGY:     Allergies as of 01/17/2025    (No Known Allergies)     MEDICATIONS:     Current Outpatient Medications   Medication Sig Dispense Refill    atorvastatin 40 MG Oral Tab Take 1 tablet (40 mg total) by mouth nightly. 90 tablet 1    pantoprazole 40 MG Oral Tab EC TAKE 1 TABLET(40 MG) BY MOUTH EVERY DAY 30 MINUTES BEFORE BREAKFAST 90 tablet 1    Dulaglutide (TRULICITY) 1.5 MG/0.5ML Subcutaneous Solution Auto-injector Inject 1.5 mg into the skin once a week. 6 mL 0    metFORMIN  MG Oral Tablet 24 Hr Take 2 tablets (1,000 mg total) by mouth 2 (two) times daily with meals. 360 tablet 0    losartan 50 MG Oral Tab Take 1 tablet (50 mg total) by mouth daily. 90 tablet 3      Past Medical History:    Arthritis    Diabetes mellitus (HCC)    Flatulence/gas pain/belching    Heartburn    Osteoarthritis    left shoulder    Type II or unspecified type diabetes mellitus without mention of complication, not stated as uncontrolled    Visual impairment    reading glasses      Social History:  Social History     Socioeconomic History    Marital status:     Number of children: 2   Occupational History    Occupation: Service (customer service)     Comment: [employer]    Tobacco  Use    Smoking status: Never     Passive exposure: Never    Smokeless tobacco: Never   Vaping Use    Vaping status: Never Used   Substance and Sexual Activity    Alcohol use: Yes     Alcohol/week: 1.0 - 2.0 standard drink of alcohol     Types: 1 - 2 Standard drinks or equivalent per week     Comment: 1 drink per week    Drug use: No    Sexual activity: Yes     Partners: Female   Other Topics Concern     Service No    Blood Transfusions No    Caffeine Concern Yes    Occupational Exposure No    Hobby Hazards No    Sleep Concern Yes     Comment: up at least once/night, sometimes x 2, hard to get back asleep    Stress Concern No    Weight Concern Yes    Special Diet Yes    Exercise Yes    Seat Belt Yes   Social History Narrative    Children 19 (daughter, , Ailyn [place] in [place]) & 16 (son). ([facility] [facility]). No pets.     Social Drivers of Health     Food Insecurity: No Food Insecurity (1/17/2025)    NCSS - Food Insecurity     Worried About Running Out of Food in the Last Year: No     Ran Out of Food in the Last Year: No   Housing Stability: Not At Risk (1/17/2025)    NCSS - Housing/Utilities     Has Housing: Yes     Worried About Losing Housing: No     Unable to Get Utilities: No        REVIEW OF SYSTEMS:   A comprehensive 10 point review of systems was completed.  Pertinent positives and negatives noted in the the HPI.    EXAM:   /80   Pulse 76   Temp 98 °F (36.7 °C) (Temporal)   Resp 18   Ht 5' 8.5\" (1.74 m)   Wt 220 lb (99.8 kg)   SpO2 98%   BMI 32.96 kg/m²   GENERAL: obese, in no apparent distress  SKIN: no rashes,no suspicious lesions  HEENT: atraumatic, normocephalic,ears and throat are clear  NECK: supple,no adenopathy,no bruits  LUNGS: clear to auscultation  CARDIO: RRR without murmur  GI: good BS's,no masses, HSM or tenderness  EXTREMITIES: no cyanosis, clubbing or edema  Bilateral barefoot skin diabetic exam is normal, visualized feet and the appearance is  normal.  Bilateral monofilament/sensation of both feet is normal.  Pulsation pedal pulse exam of both lower legs/feet is normal as well.      NOTE TO PATIENT: The 21st Century Cures Act makes clinical notes like these available to patients in the interest of transparency. Clinical notes are medical documents used by physicians and care providers to communicate with each other. These documents include medical language and terminology, abbreviations, and treatment information that may sound technical and at times possibly unfamiliar. In addition, at times, the verbiage may appear blunt or direct. These documents are one tool providers use to communicate relevant information and clinical opinions of the care providers in a way that allows common understanding of the clinical context.      June Garland MD    01/17/25 7:36 AM

## 2025-04-16 ENCOUNTER — LAB ENCOUNTER (OUTPATIENT)
Dept: LAB | Age: 60
End: 2025-04-16
Attending: FAMILY MEDICINE
Payer: COMMERCIAL

## 2025-04-16 DIAGNOSIS — E11.65 UNCONTROLLED TYPE 2 DIABETES MELLITUS WITH HYPERGLYCEMIA, WITHOUT LONG-TERM CURRENT USE OF INSULIN (HCC): ICD-10-CM

## 2025-04-16 DIAGNOSIS — E78.00 HYPERCHOLESTEROLEMIA: ICD-10-CM

## 2025-04-16 LAB
ALBUMIN SERPL-MCNC: 4.5 G/DL (ref 3.2–4.8)
ALBUMIN/GLOB SERPL: 1.9 {RATIO} (ref 1–2)
ALP LIVER SERPL-CCNC: 82 U/L (ref 45–117)
ALT SERPL-CCNC: 17 U/L (ref 10–49)
ANION GAP SERPL CALC-SCNC: 6 MMOL/L (ref 0–18)
AST SERPL-CCNC: 17 U/L (ref ?–34)
BILIRUB SERPL-MCNC: 0.6 MG/DL (ref 0.3–1.2)
BUN BLD-MCNC: 17 MG/DL (ref 9–23)
CALCIUM BLD-MCNC: 9.7 MG/DL (ref 8.7–10.6)
CHLORIDE SERPL-SCNC: 102 MMOL/L (ref 98–112)
CHOLEST SERPL-MCNC: 143 MG/DL (ref ?–200)
CO2 SERPL-SCNC: 30 MMOL/L (ref 21–32)
CREAT BLD-MCNC: 1.09 MG/DL (ref 0.7–1.3)
CREAT UR-SCNC: 91.6 MG/DL
EGFRCR SERPLBLD CKD-EPI 2021: 79 ML/MIN/1.73M2 (ref 60–?)
EST. AVERAGE GLUCOSE BLD GHB EST-MCNC: 148 MG/DL (ref 68–126)
FASTING PATIENT LIPID ANSWER: YES
FASTING STATUS PATIENT QL REPORTED: YES
GLOBULIN PLAS-MCNC: 2.4 G/DL (ref 2–3.5)
GLUCOSE BLD-MCNC: 165 MG/DL (ref 70–99)
HBA1C MFR BLD: 6.8 % (ref ?–5.7)
HDLC SERPL-MCNC: 45 MG/DL (ref 40–59)
LDLC SERPL CALC-MCNC: 82 MG/DL (ref ?–100)
MICROALBUMIN UR-MCNC: <0.3 MG/DL
NONHDLC SERPL-MCNC: 98 MG/DL (ref ?–130)
OSMOLALITY SERPL CALC.SUM OF ELEC: 291 MOSM/KG (ref 275–295)
POTASSIUM SERPL-SCNC: 4.2 MMOL/L (ref 3.5–5.1)
PROT SERPL-MCNC: 6.9 G/DL (ref 5.7–8.2)
SODIUM SERPL-SCNC: 138 MMOL/L (ref 136–145)
TRIGL SERPL-MCNC: 86 MG/DL (ref 30–149)
VLDLC SERPL CALC-MCNC: 14 MG/DL (ref 0–30)

## 2025-04-16 PROCEDURE — 82570 ASSAY OF URINE CREATININE: CPT

## 2025-04-16 PROCEDURE — 36415 COLL VENOUS BLD VENIPUNCTURE: CPT

## 2025-04-16 PROCEDURE — 82043 UR ALBUMIN QUANTITATIVE: CPT

## 2025-04-16 PROCEDURE — 83036 HEMOGLOBIN GLYCOSYLATED A1C: CPT

## 2025-04-16 PROCEDURE — 80053 COMPREHEN METABOLIC PANEL: CPT

## 2025-04-16 PROCEDURE — 80061 LIPID PANEL: CPT

## 2025-04-18 ENCOUNTER — OFFICE VISIT (OUTPATIENT)
Dept: FAMILY MEDICINE CLINIC | Facility: CLINIC | Age: 60
End: 2025-04-18
Payer: COMMERCIAL

## 2025-04-18 VITALS
DIASTOLIC BLOOD PRESSURE: 76 MMHG | OXYGEN SATURATION: 98 % | WEIGHT: 215 LBS | SYSTOLIC BLOOD PRESSURE: 128 MMHG | TEMPERATURE: 98 F | HEART RATE: 80 BPM | RESPIRATION RATE: 18 BRPM | HEIGHT: 68.5 IN | BODY MASS INDEX: 32.21 KG/M2

## 2025-04-18 DIAGNOSIS — E66.811 CLASS 1 OBESITY DUE TO EXCESS CALORIES WITH SERIOUS COMORBIDITY AND BODY MASS INDEX (BMI) OF 32.0 TO 32.9 IN ADULT: ICD-10-CM

## 2025-04-18 DIAGNOSIS — E66.09 CLASS 1 OBESITY DUE TO EXCESS CALORIES WITH SERIOUS COMORBIDITY AND BODY MASS INDEX (BMI) OF 32.0 TO 32.9 IN ADULT: ICD-10-CM

## 2025-04-18 DIAGNOSIS — Z00.00 LABORATORY EXAMINATION ORDERED AS PART OF A ROUTINE GENERAL MEDICAL EXAMINATION: ICD-10-CM

## 2025-04-18 DIAGNOSIS — J30.1 SEASONAL ALLERGIC RHINITIS DUE TO POLLEN: ICD-10-CM

## 2025-04-18 DIAGNOSIS — E11.9 CONTROLLED TYPE 2 DIABETES MELLITUS WITHOUT COMPLICATION, WITHOUT LONG-TERM CURRENT USE OF INSULIN (HCC): Primary | ICD-10-CM

## 2025-04-18 DIAGNOSIS — E78.00 HYPERCHOLESTEROLEMIA: ICD-10-CM

## 2025-04-18 DIAGNOSIS — K21.00 GASTROESOPHAGEAL REFLUX DISEASE WITH ESOPHAGITIS, UNSPECIFIED WHETHER HEMORRHAGE: ICD-10-CM

## 2025-04-18 PROCEDURE — 99214 OFFICE O/P EST MOD 30 MIN: CPT | Performed by: FAMILY MEDICINE

## 2025-04-18 RX ORDER — DULAGLUTIDE 1.5 MG/.5ML
1.5 INJECTION, SOLUTION SUBCUTANEOUS WEEKLY
Qty: 6 ML | Refills: 1 | Status: SHIPPED | OUTPATIENT
Start: 2025-04-18

## 2025-04-18 RX ORDER — PANTOPRAZOLE SODIUM 40 MG/1
TABLET, DELAYED RELEASE ORAL
Qty: 90 TABLET | Refills: 1 | Status: SHIPPED | OUTPATIENT
Start: 2025-04-18

## 2025-04-18 RX ORDER — METFORMIN HYDROCHLORIDE 500 MG/1
1000 TABLET, EXTENDED RELEASE ORAL 2 TIMES DAILY WITH MEALS
Qty: 360 TABLET | Refills: 1 | Status: SHIPPED | OUTPATIENT
Start: 2025-04-18

## 2025-04-18 RX ORDER — ATORVASTATIN CALCIUM 40 MG/1
40 TABLET, FILM COATED ORAL NIGHTLY
Qty: 90 TABLET | Refills: 1 | Status: SHIPPED | OUTPATIENT
Start: 2025-04-18

## 2025-04-18 NOTE — PROGRESS NOTES
Family Medicine Progress Note  ASSESSMENT AND PLAN:  Chris Timmons is a 59 year old male who is here for:     Chris was seen today for diabetes, other and lab results.    Diagnoses and all orders for this visit:    Controlled type 2 diabetes mellitus without complication, without long-term current use of insulin (HCC)  Improved HgA1c with increased dose of Trulicity. Fasting glucose levels remain elevated, ranging from the high 160s to low 170s. A1c has improved to 6.8, indicating better overall control. The Somogyi effect may contribute to elevated morning glucose levels. Currently on Dulaglutide and Metformin, with no reported side effects from the increased dose of Dulaglutide.   - Continue Dulaglutide 1.5 MG/0.5ML subcutaneous once a week  - Continue Metformin  MG oral twice daily with meals  - Recommend a bedtime snack of sugar-free yogurt with berries to address potential Somogyi effect  - Send medication refills  - Schedule follow-up in six months  -     metFORMIN  MG Oral Tablet 24 Hr; Take 2 tablets (1,000 mg total) by mouth 2 (two) times daily with meals.  -     Dulaglutide (TRULICITY) 1.5 MG/0.5ML Subcutaneous Solution Auto-injector; Inject 1.5 mg into the skin once a week.  -     Comp Metabolic Panel (14); Future  -     Hemoglobin A1C; Future    Hypercholesterolemia  Well controlled with the current dose of medication, continue the same dose of medication.  -     atorvastatin 40 MG Oral Tab; Take 1 tablet (40 mg total) by mouth nightly.  -     Lipid Panel; Future    Gastroesophageal reflux disease with esophagitis, unspecified whether hemorrhage  -     pantoprazole 40 MG Oral Tab EC; TAKE 1 TABLET(40 MG) BY MOUTH EVERY DAY 30 MINUTES BEFORE BREAKFAST    Class 1 obesity due to excess calories with serious comorbidity and body mass index (BMI) of 32.0 to 32.9 in adult        - encouraged to continue to limit calorie intake        - limit refined sugars and carbs in diet        - increase  protein intake to 15-30 gm a day        - exercise for 150 min a week including cardio and strength training.    Seasonal allergic rhinitis due to pollen  Experiences symptoms primarily in the morning, with improvement after nasal clearance. Uses Claritin or Allegra as needed, which provides relief. Symptoms are exacerbated by outdoor activities such as yard work.  - Continue using Claritin or Allegra as needed for allergy symptoms    Laboratory examination ordered as part of a routine general medical examination  -     CBC With Differential With Platelet; Future  -     Assay, Thyroid Stim Hormone; Future  -     Comp Metabolic Panel (14); Future  -     Lipid Panel; Future       The patient indicates understanding of these issues and agrees to the plan.  Follow-Up: The patient is asked to  Return in about 6 months (around 10/18/2025) for annual, Diabetes f/u, HTN f/u, hyperlipidemia.  .     June Garland MD        CC: Diabetes and Lab Results     The following individual(s) verbally consented to be recorded using ambient AI listening technology and understand that they can each withdraw their consent to this listening technology at any point by asking the clinician to turn off or pause the recording:    Patient name: Chris Timmons    HPI:     History of Present Illness  Chris Timmons is a 59 year old male with type 2 diabetes who presents for follow-up of his diabetes management after dose of trulicity was increased to 1.5 mg.    He has experienced an increase in fasting blood glucose levels, now ranging from the high 160s to low 170s, despite no changes in his routine. Previously, his levels were between 145 to 155. He has increased the dose of Trulicity, which has been beneficial without any side effects. He takes Metformin 500 mg, sometimes once or twice a day, depending on his blood sugar control, and adjusts his intake based on glucose readings. He has not experienced any issues with medication  coverage, although he occasionally faces challenges with insurance regarding Trulicity, which is sometimes mistakenly flagged for weight loss use.    He typically eats dinner between 5 and 6 PM . He denies chest pain, trouble breathing, dizziness, headaches, or leg swelling.    He has seasonal allergies, primarily in the morning, with postnasal drainage that resolves after he gets up and moves around. He uses Claritin or Allegra as needed, which provides relief.      ALLERGY:     Allergies as of 04/18/2025    (No Known Allergies)       MEDICATIONS:     Current Medications[1]   Past Medical History[2]   Social History:  Short Social Hx on File[3]     REVIEW OF SYSTEMS:   GENERAL HEALTH: feels well otherwise  SKIN: denies any unusual skin lesions or rashes  RESPIRATORY: denies shortness of breath with exertion  CARDIOVASCULAR: denies chest pain on exertion  GI: denies abdominal pain and denies heartburn  NEURO: denies headaches    EXAM:   /76   Pulse 80   Temp 98.2 °F (36.8 °C) (Temporal)   Resp 18   Ht 5' 8.5\" (1.74 m)   Wt 215 lb (97.5 kg)   SpO2 98%   BMI 32.22 kg/m²   GENERAL: obese,in no apparent distress  NECK: supple,no adenopathy,no bruits  LUNGS: clear to auscultation  CARDIO: RRR without murmur  GI: good BS's,no masses, HSM or tenderness  EXTREMITIES: no  edema    Component      Latest Ref Rng 1/16/2025 4/16/2025   Glucose      70 - 99 mg/dL  165 (H)    Sodium      136 - 145 mmol/L  138    Potassium      3.5 - 5.1 mmol/L  4.2    Chloride      98 - 112 mmol/L  102    Carbon Dioxide, Total      21.0 - 32.0 mmol/L  30.0    ANION GAP      0 - 18 mmol/L  6    BUN      9 - 23 mg/dL  17    CREATININE      0.70 - 1.30 mg/dL  1.09    CALCIUM      8.7 - 10.6 mg/dL  9.7    CALCULATED OSMOLALITY      275 - 295 mOsm/kg  291    EGFR      >=60 mL/min/1.73m2  79    AST (SGOT)      <34 U/L  17    ALT (SGPT)      10 - 49 U/L  17    ALKALINE PHOSPHATASE      45 - 117 U/L  82    Total Bilirubin      0.3 - 1.2 mg/dL   0.6    PROTEIN, TOTAL      5.7 - 8.2 g/dL  6.9    Albumin      3.2 - 4.8 g/dL  4.5    Globulin      2.0 - 3.5 g/dL  2.4    A/G Ratio      1.0 - 2.0   1.9    Patient Fasting for CMP?  Yes    Cholesterol, Total      <200 mg/dL  143    HDL Cholesterol      40 - 59 mg/dL  45    Triglycerides      30 - 149 mg/dL  86    LDL Cholesterol Calc      <100 mg/dL  82    VLDL      0 - 30 mg/dL  14    NON-HDL CHOLESTEROL      <130 mg/dL  98    Patient Fasting for Lipid?  Yes    MALB URINE      mg/dL  <0.30    CREATININE UR RANDOM      mg/dL  91.60    MALB/CRE CALC  --    HEMOGLOBIN A1c      <5.7 % 7.6 (H)  6.8 (H)    ESTIMATED AVERAGE GLUCOSE      68 - 126 mg/dL 171 (H)  148 (H)       Legend:  (H) High  NOTE TO PATIENT: The 21st Century Cures Act makes clinical notes like these available to patients in the interest of transparency. Clinical notes are medical documents used by physicians and care providers to communicate with each other. These documents include medical language and terminology, abbreviations, and treatment information that may sound technical and at times possibly unfamiliar. In addition, at times, the verbiage may appear blunt or direct. These documents are one tool providers use to communicate relevant information and clinical opinions of the care providers in a way that allows common understanding of the clinical context.      June Garland MD             [1]   Current Outpatient Medications   Medication Sig Dispense Refill    atorvastatin 40 MG Oral Tab Take 1 tablet (40 mg total) by mouth nightly. 90 tablet 1    pantoprazole 40 MG Oral Tab EC TAKE 1 TABLET(40 MG) BY MOUTH EVERY DAY 30 MINUTES BEFORE BREAKFAST 90 tablet 1    metFORMIN  MG Oral Tablet 24 Hr Take 2 tablets (1,000 mg total) by mouth 2 (two) times daily with meals. 360 tablet 0    losartan 50 MG Oral Tab Take 1 tablet (50 mg total) by mouth daily. 90 tablet 3   [2]   Past Medical History:   Arthritis    Diabetes mellitus (HCC)     Flatulence/gas pain/belching    Heartburn    Osteoarthritis    left shoulder    Type II or unspecified type diabetes mellitus without mention of complication, not stated as uncontrolled    Visual impairment    reading glasses   [3]   Social History  Socioeconomic History    Marital status:     Number of children: 2   Occupational History    Occupation: Service (customer service)     Comment: [employer]    Tobacco Use    Smoking status: Never     Passive exposure: Never    Smokeless tobacco: Never   Vaping Use    Vaping status: Never Used   Substance and Sexual Activity    Alcohol use: Yes     Alcohol/week: 1.0 - 2.0 standard drink of alcohol     Types: 1 - 2 Standard drinks or equivalent per week     Comment: 1 drink per week    Drug use: No    Sexual activity: Yes     Partners: Female   Other Topics Concern     Service No    Blood Transfusions No    Caffeine Concern Yes    Occupational Exposure No    Hobby Hazards No    Sleep Concern Yes     Comment: up at least once/night, sometimes x 2, hard to get back asleep    Stress Concern No    Weight Concern Yes    Special Diet Yes    Exercise Yes    Seat Belt Yes   Social History Narrative    Children 19 (daughter, , [place]ily[place] in [place]) & 16 (son). ([employer]). No pets.     Social Drivers of Health     Food Insecurity: No Food Insecurity (1/17/2025)    NCSS - Food Insecurity     Worried About Running Out of Food in the Last Year: No     Ran Out of Food in the Last Year: No   Housing Stability: Not At Risk (1/17/2025)    NCSS - Housing/Utilities     Has Housing: Yes     Worried About Losing Housing: No     Unable to Get Utilities: No

## 2025-04-18 NOTE — PATIENT INSTRUCTIONS
VISIT SUMMARY:    You came in today for a follow-up on your diabetes management. We discussed your recent blood glucose levels, medication adjustments, and seasonal allergies.    YOUR PLAN:    -UNCONTROLLED TYPE 2 DIABETES MELLITUS WITH HYPERGLYCEMIA: Your blood sugar levels have been higher in the mornings, ranging from the high 160s to low 170s. This could be due to the Somogyi effect, which is a rebound high blood sugar that occurs in the morning after a low blood sugar episode at night. We will continue your current medications, Dulaglutide and Metformin, and recommend having a bedtime snack of sugar-free yogurt with berries to help manage this. We also discussed the importance of continuing your medications despite occasional insurance issues. Your A1c has improved to 6.8, which is a good sign of overall control.    -SEASONAL ALLERGIES: You experience morning allergy symptoms that improve after you get up and move around. You can continue using Claritin or Allegra as needed for relief, especially when symptoms are worse due to outdoor activities like yard work.    -GENERAL HEALTH MAINTENANCE: You have lost five pounds and are encouraged to increase your physical activity as the weather improves. This will help with your diabetes management. We will plan for a physical examination and blood work at your next visit.    INSTRUCTIONS:    Please continue taking Dulaglutide 1.5 MG/0.5ML subcutaneously once a week and Metformin  MG orally twice daily with meals. Have a bedtime snack of sugar-free yogurt with berries to help manage your morning blood sugar levels. Schedule a follow-up appointment in six months for a physical examination and blood work.

## (undated) DIAGNOSIS — E11.65 UNCONTROLLED TYPE 2 DIABETES MELLITUS WITH HYPERGLYCEMIA (HCC): ICD-10-CM

## (undated) DEVICE — GOWN,SIRUS,FABRIC-REINFORCED,LARGE: Brand: MEDLINE

## (undated) DEVICE — CONVERTORS STOCKINETTE: Brand: CONVERTORS

## (undated) DEVICE — GLOVE SURG SENSICARE SZ 8-1/2

## (undated) DEVICE — STANDARD HYPODERMIC NEEDLE,POLYPROPYLENE HUB: Brand: MONOJECT

## (undated) DEVICE — LAPAROTOMY SPONGE - RF AND X-RAY DETECTABLE PRE-WASHED: Brand: SITUATE

## (undated) DEVICE — STERILE POLYISOPRENE POWDER-FREE SURGICAL GLOVES: Brand: PROTEXIS

## (undated) DEVICE — ORTHO CDS-LF: Brand: MEDLINE INDUSTRIES, INC.

## (undated) DEVICE — Device

## (undated) DEVICE — ABDOMINAL PAD: Brand: DERMACEA

## (undated) DEVICE — FAN SPRAY KIT: Brand: PULSAVAC®

## (undated) DEVICE — 1010 S-DRAPE TOWEL DRAPE 10/BX: Brand: STERI-DRAPE™

## (undated) DEVICE — Device: Brand: STABLECUT®

## (undated) DEVICE — 3M™ IOBAN™ 2 ANTIMICROBIAL INCISE DRAPE 6650EZ: Brand: IOBAN™ 2

## (undated) DEVICE — 3M™ MICROFOAM™ TAPE 1528-4: Brand: 3M™ MICROFOAM™

## (undated) DEVICE — PROXIMATE SKIN STAPLERS (35 WIDE) CONTAINS 35 STAINLESS STEEL STAPLES (FIXED HEAD): Brand: PROXIMATE

## (undated) DEVICE — SUTURE VICRYL 2-0 CP-1

## (undated) DEVICE — SUTURE ETHIBOND 2 OS-4

## (undated) DEVICE — DRESSING AQUACEL AG 3.5 X 10

## (undated) DEVICE — KENDALL SCD EXPRESS SLEEVES, KNEE LENGTH, MEDIUM: Brand: KENDALL SCD

## (undated) DEVICE — SOL  .9 1000ML BTL

## (undated) DEVICE — SUTURE VICRYL 0 CP-1

## (undated) DEVICE — INTENDED TO AID IN THE PASSING OF SUTURES THROUGH BONE AND SOFT TISSUE DURING ORTHOPEDIC SURGERY: Brand: HOFFEE SUTURE RETRIEVER

## (undated) DEVICE — NON-ADHERENT STRIPS,OIL EMULSION: Brand: CURITY

## (undated) DEVICE — PREMIUM WET SKIN PREP TRAY: Brand: MEDLINE INDUSTRIES, INC.

## (undated) NOTE — LETTER
03/16/20        Deanna Branham  [facility]      Dear Bryan Concepcion,    [id number] records indicate that you have outstanding lab work and or testing that was ordered for you and has not yet been completed:  Orders Placed This Encounte

## (undated) NOTE — LETTER
12/21/17        Ny Whalne  [facility]      Dear Joy Alicia,    [facility] records indicate that you have outstanding lab work and or testing that was ordered for you and has not yet been completed:          Ferritin [E]      C

## (undated) NOTE — LETTER
11/01/18        Hakeem Krishna Jarales      Dear Elayne Scott,    [facility] records indicate that you have outstanding lab work and or testing that was ordered for you and has not yet been completed:  Orders Placed This Encounte

## (undated) NOTE — LETTER
[facility] Testing Department  Phone: [phone]  Right Fax: [phone]  [facility] By: YUSEF Dao RN* Date: 2/13/17    Patient Name: Robbi Green Castle  Surgery Date: 2/17/2017    CSN: [id number]  Medical Record: [id number]   DO

## (undated) NOTE — LETTER
03/26/19        Rajeev Funez  [facility]      Dear Jimi Sky,    [facility] records indicate that you have outstanding lab work and or testing that was ordered for you and has not yet been completed:  Orders Placed This Encounte

## (undated) NOTE — LETTER
02/20/18        Nena Dalal 03 [id number]      Dear Anthony Armendariz,    [facility] records indicate that you have outstanding lab work and or testing that was ordered for you and has not yet been completed:          Ferritin [E]      C

## (undated) NOTE — IP AVS SNAPSHOT
[facility] [place] [address] [place], [address] ~ [phone]                Discharge Summary   2/17/2017    Laina Lock           Admission Information        Provider Department    2/17/2017 Jj Ludwig MD  3sw-A Stop taking on:  2/25/2017    Aung Isbell                             CHANGE how you take these medications        Instructions Authorizing Provider    Morning Afternoon Evening As Needed    glimepiride 2 MG Tabs   Commonly known as:  AMOANH   What talavera Information about where to get these medications is not yet available     ! Ask your nurse or doctor about these medications    - PEG 3350 Pack              Patient Instructions       Follow up with Dr Guadalupe Kaba in 2 weeks     Dr. Soliz Coad:   Total Shoulder: di ? Keep pain manageable; pain should not disrupt your sleep or activities like getting out of bed or walking. ? As you have less discomfort, decrease the amount of pain medication you take. Use plain Tylenol for less severe pain. ?  Use ice therapy or cali ? Constipation – common with the use of narcotics. ? Use stool softeners such as Colace or Senokot while on narcotics, and laxatives if needed. Eat fiber rich foods and drink plenty of fluids, water is a choice liquid. ?  An enema may be needed if above m ? Your surgeon would like to be informed of any incision questions you have before you go to the ER, you can contact Dr. Nandini Warren at [phone]  ? Your surgeon’s office is available 24 hours a day 7 days a week.  The answering service will direct your call Immunization History as of 2/18/2017  Reviewed on 2/17/2017    No immunizations on file.       Recent Hematology Lab Results  (Last 3 results in the past 90 days)    WBC RBC Hemoglobin Hematocrit MCV MCH MCHC RDW Platelet MPV    (27/40/29)  11.8 (02/18/17) - If you don’t have insurance, Arabella Ambriz has partnered with [employer] to help you get signed up for insurance coverage.   Patient [employer] is a Federal Navigator program that can help with your Affordable Care Act cover What to report to your healthcare team:  Low blood sugar (less than 70) twice a week or high blood sugar (greater than 200) for more than 2-3 days           Narcotic Medications     HYDROcodone-acetaminophen  MG Oral Tab       Use:  Treat pain   Most

## (undated) NOTE — MR AVS SNAPSHOT
[address] [address] [phone]               Thank you for choosing us for your health care visit with Niharika Neri MD.  We are glad to serve you and happy to provide you with this Assoc Dx:  Hypercholesterolemia [E78.00]           Hemoglobin A1C in 3 months    Complete by:  Jun 07, 2017 (Approximate)    Assoc Dx:  Controlled type 2 diabetes mellitus without complication, without long-term current use of insulin ([facility].) [E11.9] Take 1 tablet (500 mg total) by mouth 2 (two) times daily with meals. Commonly known as:  GLUCOPHAGE-XR           MULTIVITAMIN OR   Take 1 tablet by mouth daily.            ONETOUCH LANCETS Misc   Test once daily           ONETOUCH ULTRA BLUE Strp   Gener

## (undated) NOTE — Clinical Note
January 18, 2017     Ny Walsh      Dear Joy Alicia:    Below are the results from your recent visit:    Resulted Orders   COMP METABOLIC PANEL (14)   Result Value Ref Range    Glucose 141 (H) 70-99 mg/dL

## (undated) NOTE — MR AVS SNAPSHOT
[address] [address] [address] [phone]               Thank you for choosing us for your health care visit with Ofelia Evans MD.  We are glad to serve you and happy to provide you with this Commonly known as:  AMARYL           MetFORMIN HCl  MG Tb24   Take 1 tablet (500 mg total) by mouth 2 (two) times daily with meals. Commonly known as:  GLUCOPHAGE-XR           MULTIVITAMIN OR   Take 1 tablet by mouth daily.            Robb Kaba

## (undated) NOTE — MR AVS SNAPSHOT
[facility] [address] [place] [id number]  [phone]               Thank you for choosing us for your health care visit with Breanna Diallo MD.  We are glad to serve you and happy to provide you with this [facility]   [phone]            Feb 17, 2017  8:00 AM   Hospital Surgery with Tc Rai MD   [address] [place] [address] ([address] [place] [address])    [address] [place] [id number] [phone] Hemoglobin A1C now    Complete by:  Jan 09, 2017 (Approximate)        Microalb/Creat Ratio, now    Complete by:  Jan 09, 2017              Referral Information     Referral Order Referred to [address] Mariela Moran Phone Visits Status Diagnosis           Controlled